# Patient Record
Sex: FEMALE | Race: WHITE | NOT HISPANIC OR LATINO | Employment: UNEMPLOYED | URBAN - METROPOLITAN AREA
[De-identification: names, ages, dates, MRNs, and addresses within clinical notes are randomized per-mention and may not be internally consistent; named-entity substitution may affect disease eponyms.]

---

## 2021-09-08 ENCOUNTER — TELEPHONE (OUTPATIENT)
Dept: RADIOLOGY | Facility: HOSPITAL | Age: 56
End: 2021-09-08

## 2021-09-13 ENCOUNTER — OFFICE VISIT (OUTPATIENT)
Dept: NEUROSURGERY | Facility: CLINIC | Age: 56
End: 2021-09-13
Payer: MEDICARE

## 2021-09-13 VITALS
HEIGHT: 71 IN | HEART RATE: 63 BPM | BODY MASS INDEX: 26.6 KG/M2 | DIASTOLIC BLOOD PRESSURE: 77 MMHG | WEIGHT: 190 LBS | SYSTOLIC BLOOD PRESSURE: 113 MMHG

## 2021-09-13 DIAGNOSIS — M54.14 THORACIC RADICULOPATHY: ICD-10-CM

## 2021-09-13 DIAGNOSIS — R07.9 CHEST PAIN, UNSPECIFIED TYPE: Primary | ICD-10-CM

## 2021-09-13 PROCEDURE — 99999 PR PBB SHADOW E&M-EST. PATIENT-LVL III: CPT | Mod: PBBFAC,,, | Performed by: NEUROLOGICAL SURGERY

## 2021-09-13 PROCEDURE — 99999 PR PBB SHADOW E&M-EST. PATIENT-LVL III: ICD-10-PCS | Mod: PBBFAC,,, | Performed by: NEUROLOGICAL SURGERY

## 2021-09-13 PROCEDURE — 99204 OFFICE O/P NEW MOD 45 MIN: CPT | Mod: S$PBB,,, | Performed by: NEUROLOGICAL SURGERY

## 2021-09-13 PROCEDURE — 99213 OFFICE O/P EST LOW 20 MIN: CPT | Mod: PBBFAC,PN | Performed by: NEUROLOGICAL SURGERY

## 2021-09-13 PROCEDURE — 99204 PR OFFICE/OUTPT VISIT, NEW, LEVL IV, 45-59 MIN: ICD-10-PCS | Mod: S$PBB,,, | Performed by: NEUROLOGICAL SURGERY

## 2021-09-13 RX ORDER — HYDROCODONE BITARTRATE AND ACETAMINOPHEN 7.5; 325 MG/1; MG/1
1 TABLET ORAL 2 TIMES DAILY PRN
COMMUNITY
Start: 2021-08-10

## 2021-09-13 RX ORDER — ESCITALOPRAM OXALATE 20 MG/1
20 TABLET ORAL DAILY
COMMUNITY
Start: 2021-04-15

## 2021-09-13 RX ORDER — ESTRADIOL 2 MG/1
2 TABLET ORAL DAILY
COMMUNITY
Start: 2021-07-19

## 2021-09-15 ENCOUNTER — TELEPHONE (OUTPATIENT)
Dept: NEUROSURGERY | Facility: CLINIC | Age: 56
End: 2021-09-15

## 2021-09-17 ENCOUNTER — TELEPHONE (OUTPATIENT)
Dept: CARDIOLOGY | Facility: CLINIC | Age: 56
End: 2021-09-17

## 2021-09-28 ENCOUNTER — TELEPHONE (OUTPATIENT)
Dept: CARDIOLOGY | Facility: CLINIC | Age: 56
End: 2021-09-28

## 2021-10-04 ENCOUNTER — TELEPHONE (OUTPATIENT)
Dept: NEUROSURGERY | Facility: CLINIC | Age: 56
End: 2021-10-04

## 2021-10-21 ENCOUNTER — OFFICE VISIT (OUTPATIENT)
Dept: CARDIOLOGY | Facility: CLINIC | Age: 56
End: 2021-10-21
Payer: MEDICARE

## 2021-10-21 VITALS
HEIGHT: 71 IN | WEIGHT: 194.25 LBS | HEART RATE: 56 BPM | DIASTOLIC BLOOD PRESSURE: 89 MMHG | BODY MASS INDEX: 27.19 KG/M2 | SYSTOLIC BLOOD PRESSURE: 141 MMHG

## 2021-10-21 DIAGNOSIS — R07.9 CHEST PAIN, UNSPECIFIED TYPE: ICD-10-CM

## 2021-10-21 DIAGNOSIS — Z01.810 PREOP CARDIOVASCULAR EXAM: ICD-10-CM

## 2021-10-21 PROCEDURE — 99204 PR OFFICE/OUTPT VISIT, NEW, LEVL IV, 45-59 MIN: ICD-10-PCS | Mod: S$PBB,25,, | Performed by: INTERNAL MEDICINE

## 2021-10-21 PROCEDURE — 99204 OFFICE O/P NEW MOD 45 MIN: CPT | Mod: S$PBB,25,, | Performed by: INTERNAL MEDICINE

## 2021-10-21 PROCEDURE — 99213 OFFICE O/P EST LOW 20 MIN: CPT | Mod: PBBFAC,PO | Performed by: INTERNAL MEDICINE

## 2021-10-21 PROCEDURE — 99999 PR PBB SHADOW E&M-EST. PATIENT-LVL III: ICD-10-PCS | Mod: PBBFAC,,, | Performed by: INTERNAL MEDICINE

## 2021-10-21 PROCEDURE — 99999 PR PBB SHADOW E&M-EST. PATIENT-LVL III: CPT | Mod: PBBFAC,,, | Performed by: INTERNAL MEDICINE

## 2021-10-21 PROCEDURE — 93010 ELECTROCARDIOGRAM REPORT: CPT | Mod: ,,, | Performed by: INTERNAL MEDICINE

## 2021-10-21 PROCEDURE — 93005 ELECTROCARDIOGRAM TRACING: CPT | Mod: PO

## 2021-10-21 PROCEDURE — 93010 EKG 12-LEAD: ICD-10-PCS | Mod: ,,, | Performed by: INTERNAL MEDICINE

## 2021-10-21 RX ORDER — TRAZODONE HYDROCHLORIDE 50 MG/1
TABLET ORAL
COMMUNITY
Start: 2021-09-23

## 2021-10-22 DIAGNOSIS — R07.9 CHEST PAIN, UNSPECIFIED TYPE: Primary | ICD-10-CM

## 2021-10-25 ENCOUNTER — HOSPITAL ENCOUNTER (OUTPATIENT)
Dept: RADIOLOGY | Facility: HOSPITAL | Age: 56
Discharge: HOME | End: 2021-10-25
Attending: INTERNAL MEDICINE
Payer: MEDICARE

## 2021-10-25 ENCOUNTER — CLINICAL SUPPORT (OUTPATIENT)
Dept: CARDIOLOGY | Facility: HOSPITAL | Age: 56
End: 2021-10-25
Attending: INTERNAL MEDICINE
Payer: MEDICARE

## 2021-10-25 VITALS — BODY MASS INDEX: 27.77 KG/M2 | HEIGHT: 70 IN | WEIGHT: 194 LBS

## 2021-10-25 DIAGNOSIS — R07.9 CHEST PAIN, UNSPECIFIED TYPE: ICD-10-CM

## 2021-10-25 PROCEDURE — 93017 CV STRESS TEST TRACING ONLY: CPT | Mod: PO

## 2021-10-25 PROCEDURE — 78452 STRESS TEST WITH MYOCARDIAL PERFUSION (CUPID ONLY): ICD-10-PCS | Mod: 26,,, | Performed by: INTERNAL MEDICINE

## 2021-10-25 PROCEDURE — 78452 HT MUSCLE IMAGE SPECT MULT: CPT | Mod: 26,,, | Performed by: INTERNAL MEDICINE

## 2021-10-25 PROCEDURE — 63600175 PHARM REV CODE 636 W HCPCS: Mod: PO | Performed by: INTERNAL MEDICINE

## 2021-10-25 PROCEDURE — 93016 STRESS TEST WITH MYOCARDIAL PERFUSION (CUPID ONLY): ICD-10-PCS | Mod: ,,, | Performed by: INTERNAL MEDICINE

## 2021-10-25 PROCEDURE — 93016 CV STRESS TEST SUPVJ ONLY: CPT | Mod: ,,, | Performed by: INTERNAL MEDICINE

## 2021-10-25 PROCEDURE — 93018 CV STRESS TEST I&R ONLY: CPT | Mod: ,,, | Performed by: INTERNAL MEDICINE

## 2021-10-25 PROCEDURE — 78452 HT MUSCLE IMAGE SPECT MULT: CPT | Mod: PO

## 2021-10-25 PROCEDURE — 93018 PR CARDIAC STRESS TST,INTERP/REPT ONLY: ICD-10-PCS | Mod: ,,, | Performed by: INTERNAL MEDICINE

## 2021-10-25 PROCEDURE — A9502 TC99M TETROFOSMIN: HCPCS | Mod: PO

## 2021-10-25 RX ORDER — REGADENOSON 0.08 MG/ML
0.4 INJECTION, SOLUTION INTRAVENOUS ONCE
Status: COMPLETED | OUTPATIENT
Start: 2021-10-25 | End: 2021-10-25

## 2021-10-25 RX ADMIN — REGADENOSON 0.4 MG: 0.08 INJECTION, SOLUTION INTRAVENOUS at 02:10

## 2021-10-26 LAB
CV PHARM DOSE: 0.4 MG
CV STRESS BASE HR: 76 BPM
DIASTOLIC BLOOD PRESSURE: 101 MMHG
NUC REST EJECTION FRACTION: 62
OHS CV CPX 1 MINUTE RECOVERY HEART RATE: 107 BPM
OHS CV CPX 85 PERCENT MAX PREDICTED HEART RATE MALE: 135
OHS CV CPX MAX PREDICTED HEART RATE: 159
OHS CV CPX PATIENT IS FEMALE: 1
OHS CV CPX PATIENT IS MALE: 0
OHS CV CPX PEAK DIASTOLIC BLOOD PRESSURE: 98 MMHG
OHS CV CPX PEAK HEAR RATE: 113 BPM
OHS CV CPX PEAK RATE PRESSURE PRODUCT: NORMAL
OHS CV CPX PEAK SYSTOLIC BLOOD PRESSURE: 142 MMHG
OHS CV CPX PERCENT MAX PREDICTED HEART RATE ACHIEVED: 71
OHS CV CPX RATE PRESSURE PRODUCT PRESENTING: NORMAL
OHS CV PHARM TIME: 1430 MIN
SYSTOLIC BLOOD PRESSURE: 140 MMHG

## 2021-10-27 ENCOUNTER — TELEPHONE (OUTPATIENT)
Dept: CARDIOLOGY | Facility: CLINIC | Age: 56
End: 2021-10-27
Payer: MEDICARE

## 2021-10-28 ENCOUNTER — PATIENT MESSAGE (OUTPATIENT)
Dept: NEUROSURGERY | Facility: CLINIC | Age: 56
End: 2021-10-28
Payer: MEDICARE

## 2021-10-28 DIAGNOSIS — M54.14 THORACIC RADICULOPATHY: Primary | ICD-10-CM

## 2021-10-29 ENCOUNTER — TELEPHONE (OUTPATIENT)
Dept: PAIN MEDICINE | Facility: CLINIC | Age: 56
End: 2021-10-29
Payer: MEDICARE

## 2021-11-01 DIAGNOSIS — M54.14 THORACIC RADICULOPATHY: Primary | ICD-10-CM

## 2021-11-02 ENCOUNTER — PATIENT MESSAGE (OUTPATIENT)
Dept: PAIN MEDICINE | Facility: CLINIC | Age: 56
End: 2021-11-02
Payer: MEDICARE

## 2021-11-04 ENCOUNTER — TELEPHONE (OUTPATIENT)
Dept: PAIN MEDICINE | Facility: CLINIC | Age: 56
End: 2021-11-04
Payer: MEDICARE

## 2021-11-04 DIAGNOSIS — M54.14 THORACIC RADICULOPATHY: Primary | ICD-10-CM

## 2021-11-04 RX ORDER — SODIUM CHLORIDE, SODIUM LACTATE, POTASSIUM CHLORIDE, CALCIUM CHLORIDE 600; 310; 30; 20 MG/100ML; MG/100ML; MG/100ML; MG/100ML
INJECTION, SOLUTION INTRAVENOUS CONTINUOUS
Status: CANCELLED | OUTPATIENT
Start: 2021-11-04

## 2021-11-08 ENCOUNTER — TELEPHONE (OUTPATIENT)
Dept: PAIN MEDICINE | Facility: CLINIC | Age: 56
End: 2021-11-08
Payer: MEDICARE

## 2021-11-11 ENCOUNTER — TELEPHONE (OUTPATIENT)
Dept: PAIN MEDICINE | Facility: CLINIC | Age: 56
End: 2021-11-11
Payer: MEDICARE

## 2021-11-11 DIAGNOSIS — M54.14 THORACIC RADICULOPATHY: Primary | ICD-10-CM

## 2021-11-12 ENCOUNTER — HOSPITAL ENCOUNTER (OUTPATIENT)
Dept: RADIOLOGY | Facility: HOSPITAL | Age: 56
Discharge: HOME | End: 2021-11-12
Attending: ANESTHESIOLOGY
Payer: MEDICARE

## 2021-11-12 ENCOUNTER — HOSPITAL ENCOUNTER (OUTPATIENT)
Facility: HOSPITAL | Age: 56
Discharge: HOME | End: 2021-11-12
Attending: ANESTHESIOLOGY | Admitting: ANESTHESIOLOGY
Payer: MEDICARE

## 2021-11-12 VITALS
HEIGHT: 70 IN | DIASTOLIC BLOOD PRESSURE: 82 MMHG | TEMPERATURE: 98 F | RESPIRATION RATE: 18 BRPM | BODY MASS INDEX: 27.77 KG/M2 | SYSTOLIC BLOOD PRESSURE: 135 MMHG | WEIGHT: 194 LBS | HEART RATE: 64 BPM | OXYGEN SATURATION: 98 %

## 2021-11-12 DIAGNOSIS — M54.14 THORACIC RADICULOPATHY: ICD-10-CM

## 2021-11-12 PROCEDURE — 62321 NJX INTERLAMINAR CRV/THRC: CPT | Mod: ,,, | Performed by: ANESTHESIOLOGY

## 2021-11-12 PROCEDURE — 62321 NJX INTERLAMINAR CRV/THRC: CPT | Mod: PO | Performed by: ANESTHESIOLOGY

## 2021-11-12 PROCEDURE — 76000 FLUOROSCOPY <1 HR PHYS/QHP: CPT | Mod: TC,PO

## 2021-11-12 PROCEDURE — 62321 PR INJ CERV/THORAC, W/GUIDANCE: ICD-10-PCS | Mod: ,,, | Performed by: ANESTHESIOLOGY

## 2021-11-12 PROCEDURE — 99152 MOD SED SAME PHYS/QHP 5/>YRS: CPT | Mod: ,,, | Performed by: ANESTHESIOLOGY

## 2021-11-12 PROCEDURE — 63600175 PHARM REV CODE 636 W HCPCS: Mod: PO | Performed by: ANESTHESIOLOGY

## 2021-11-12 PROCEDURE — 99152 PR MOD CONSCIOUS SEDATION, SAME PHYS, 5+ YRS, FIRST 15 MIN: ICD-10-PCS | Mod: ,,, | Performed by: ANESTHESIOLOGY

## 2021-11-12 RX ORDER — MIDAZOLAM HYDROCHLORIDE 2 MG/2ML
INJECTION, SOLUTION INTRAMUSCULAR; INTRAVENOUS
Status: DISCONTINUED | OUTPATIENT
Start: 2021-11-12 | End: 2021-11-12 | Stop reason: HOSPADM

## 2021-11-12 RX ORDER — FENTANYL CITRATE 50 UG/ML
INJECTION, SOLUTION INTRAMUSCULAR; INTRAVENOUS
Status: DISCONTINUED | OUTPATIENT
Start: 2021-11-12 | End: 2021-11-12 | Stop reason: HOSPADM

## 2021-11-12 RX ORDER — SODIUM CHLORIDE, SODIUM LACTATE, POTASSIUM CHLORIDE, CALCIUM CHLORIDE 600; 310; 30; 20 MG/100ML; MG/100ML; MG/100ML; MG/100ML
INJECTION, SOLUTION INTRAVENOUS CONTINUOUS
Status: DISCONTINUED | OUTPATIENT
Start: 2021-11-12 | End: 2021-11-12 | Stop reason: HOSPADM

## 2021-11-12 RX ADMIN — SODIUM CHLORIDE, SODIUM LACTATE, POTASSIUM CHLORIDE, AND CALCIUM CHLORIDE: .6; .31; .03; .02 INJECTION, SOLUTION INTRAVENOUS at 02:11

## 2021-11-29 ENCOUNTER — OFFICE VISIT (OUTPATIENT)
Dept: PAIN MEDICINE | Facility: CLINIC | Age: 56
End: 2021-11-29
Payer: MEDICARE

## 2021-11-29 VITALS
SYSTOLIC BLOOD PRESSURE: 131 MMHG | HEIGHT: 69 IN | RESPIRATION RATE: 18 BRPM | DIASTOLIC BLOOD PRESSURE: 66 MMHG | WEIGHT: 194.31 LBS | HEART RATE: 74 BPM | BODY MASS INDEX: 28.78 KG/M2 | TEMPERATURE: 98 F | OXYGEN SATURATION: 97 %

## 2021-11-29 DIAGNOSIS — M54.14 THORACIC RADICULOPATHY: ICD-10-CM

## 2021-11-29 DIAGNOSIS — M47.814 THORACIC SPONDYLOSIS: Primary | ICD-10-CM

## 2021-11-29 PROCEDURE — 99214 OFFICE O/P EST MOD 30 MIN: CPT | Mod: PBBFAC,PN | Performed by: ANESTHESIOLOGY

## 2021-11-29 PROCEDURE — 99999 PR PBB SHADOW E&M-EST. PATIENT-LVL IV: CPT | Mod: PBBFAC,,, | Performed by: ANESTHESIOLOGY

## 2021-11-29 PROCEDURE — 99204 OFFICE O/P NEW MOD 45 MIN: CPT | Mod: S$PBB,,, | Performed by: ANESTHESIOLOGY

## 2021-11-29 PROCEDURE — 99999 PR PBB SHADOW E&M-EST. PATIENT-LVL IV: ICD-10-PCS | Mod: PBBFAC,,, | Performed by: ANESTHESIOLOGY

## 2021-11-29 PROCEDURE — 99204 PR OFFICE/OUTPT VISIT, NEW, LEVL IV, 45-59 MIN: ICD-10-PCS | Mod: S$PBB,,, | Performed by: ANESTHESIOLOGY

## 2021-11-29 RX ORDER — METHOCARBAMOL 750 MG/1
750 TABLET, FILM COATED ORAL 2 TIMES DAILY PRN
Qty: 40 TABLET | Refills: 2 | Status: SHIPPED | OUTPATIENT
Start: 2021-11-29

## 2022-02-16 ENCOUNTER — OFFICE VISIT (OUTPATIENT)
Dept: PAIN MEDICINE | Facility: CLINIC | Age: 57
End: 2022-02-16
Payer: MEDICARE

## 2022-02-16 VITALS
OXYGEN SATURATION: 98 % | BODY MASS INDEX: 29.19 KG/M2 | SYSTOLIC BLOOD PRESSURE: 122 MMHG | HEIGHT: 69 IN | DIASTOLIC BLOOD PRESSURE: 78 MMHG | HEART RATE: 75 BPM | WEIGHT: 197.06 LBS | TEMPERATURE: 97 F

## 2022-02-16 DIAGNOSIS — M54.6 MIDLINE THORACIC BACK PAIN, UNSPECIFIED CHRONICITY: ICD-10-CM

## 2022-02-16 DIAGNOSIS — M47.814 THORACIC SPONDYLOSIS: Primary | ICD-10-CM

## 2022-02-16 PROCEDURE — 99999 PR PBB SHADOW E&M-EST. PATIENT-LVL III: CPT | Mod: PBBFAC,,, | Performed by: ANESTHESIOLOGY

## 2022-02-16 PROCEDURE — 99213 OFFICE O/P EST LOW 20 MIN: CPT | Mod: S$PBB,,, | Performed by: ANESTHESIOLOGY

## 2022-02-16 PROCEDURE — 99999 PR PBB SHADOW E&M-EST. PATIENT-LVL III: ICD-10-PCS | Mod: PBBFAC,,, | Performed by: ANESTHESIOLOGY

## 2022-02-16 PROCEDURE — 99213 PR OFFICE/OUTPT VISIT, EST, LEVL III, 20-29 MIN: ICD-10-PCS | Mod: S$PBB,,, | Performed by: ANESTHESIOLOGY

## 2022-02-16 PROCEDURE — 99213 OFFICE O/P EST LOW 20 MIN: CPT | Mod: PBBFAC,PN | Performed by: ANESTHESIOLOGY

## 2022-02-16 NOTE — PROGRESS NOTES
This note was completed with dictation software and grammatical errors may exist.    CC:  Upper back pain    HPI:  The patient is a 56-year-old woman with a history of thoracic DDD who presents in referral from Dr. Ramon for thoracic radicular pain.  Since I had last seen the patient, she reports that while we had set her up for physical therapy, they stated they never had the orders and so she was never able to attend.  However she has been working with her  on stretching and massage on a daily basis for several weeks and reports that she has actually had about 90% improvement.  She still does feel some discomfort with standing too long or twisting certain ways but otherwise the pain is much better.  She states that she is back to doing most normal activity.  She has not started any strengthening activities or exercise.    Previous History:  Patient reports having pain beginning about 1 year ago, has had some neck pain in the past and upper back pain at times.  However in about October or November of 2020, she was lifting heavy tub and developed severe pain starting at her occiput that radiated through her neck and into her upper back and midback.  She states that for a couple months she had numbness and tingling in her arms and her arms were heavy.  She had seen physicians in her area who had tried what sounds like an epidural steroid injection without much relief.  She does report that the pain has now settled in her mid back, points to the bra line, radiates around the anterior chest underneath the breast but not into the breast her above that region.  She denies any pain located in her neck or arms but does continue to have numbness and tingling in her arms and heaviness every several months.  She denies any weakness in her arms, denies any balance issues.  She continues to have severe pain in her back, worse with any twisting and turning, can cause sudden sharp pain and does radiate around the  chest.  She describes the pain as sharp, shooting worse with bending, coughing or sneezing, worse at night, worse with doing any extension, flexing or lifting.  She does get some relief with rest and sitting down.  Rarely she takes hydrocodone. She was sent for a T7/8 STIVEN. She is status post T7/8 STIVEN on 11/12/2021 with no relief.     Pain intervention history:  She does report that she had what she thinks is an epidural steroid injection in the thoracic region several months ago with no relief.She is status post T7/8 STIVEN on 11/12/2021 with no relief.       Spine surgeries: She has a history of lumbar discectomy about 20 years ago    Antineuropathics:  Had side effect of swelling with gabapentin  NSAIDs:  Had swelling with Celebrex  Physical therapy:  She states that they did not send her for physical therapy because they did not know if it was her neck or thoracic spine that was causing her pain  Antidepressants:  Lexapro 20, trazodone 50  Muscle relaxers:  She takes tizanidine about once a week, causes drowsiness even the next morning  Opioids:  Rarely takes hydrocodone 5/325  Antiplatelets/Anticoagulants:        ROS:  She reports weight gain, joint stiffness, back pain and difficulty sleeping.  Balance of review of systems is negative.    No results found for: LABA1C, HGBA1C    No results found for: WBC, HGB, HCT, MCV, PLT      No past medical history on file.    Past Surgical History:   Procedure Laterality Date    CHOLECYSTECTOMY      EPIDURAL STEROID INJECTION INTO THORACIC SPINE N/A 11/12/2021    Procedure: Injection-steroid-epidural-thoracic T7/8;  Surgeon: Bryan Lao MD;  Location: [facility];  Service: Pain Management;  Laterality: N/A;    FEMUR FRACTURE SURGERY  1991    HYSTERECTOMY      KNEE SURGERY      lower back surgery         Social History     Socioeconomic History    Marital status:    Tobacco Use    Smoking status: Never Smoker    Smokeless tobacco: Never Used   Substance and  "Sexual Activity    Alcohol use: Never    Drug use: Never         Medications/Allergies: See med card    Vitals:    02/16/22 0913   BP: 122/78   Pulse: 75   Temp: 97.2 °F (36.2 °C)   SpO2: 98%   Weight: 89.4 kg (197 lb 1.5 oz)   Height: 5' 9" (1.753 m)   PainSc:   2   PainLoc: Back         Physical exam:  Gen: A and O x3, pleasant, well-groomed  Skin: No rashes or obvious lesions  HEENT: PERRLA, no obvious deformities on ears or in canals.Trachea midline.  CVS: Regular rate and rhythm, normal palpable pulses.  Resp: Clear to auscultation bilaterally, no wheezes or rales.  Abdomen: Soft, NT/ND.  Musculoskeletal: Able to heel walk, toe walk. No antalgic gait.     Neuro:  Upper extremities: 5/5 strength bilaterally   Reflexes: Brachioradialis 2+, Bicep 2+, Tricep 2+.   Sensory: Intact and symmetrical to light touch and pinprick in C2-T1 dermatomes bilaterally.    Cervical Spine:  Cervical spine: ROM is mildly reduced in flexion, extension and lateral rotation without increased pain.  Spurling's maneuver causes no neck pain to either side.  Myofascial exam: No Tenderness to palpation across cervical paraspinous region bilaterally.  No increased pain with forward flexion of the lumbar and thoracic spine but significant pain moving from fully flexed to neutral position.  No noticeable increased pain with oblique extension or extension directly backwards.        Imaging:  MRI cervical spine 04/09/2021:  Outside institution.  There appears to be slight listhesis of C3 on C4 with disc protrusion thinning the anterior thecal sac and approaching the cord without any compression of the cord, no foraminal narrowing.  At C4/5 there is disc degeneration, thinning of the anterior thecal sac and left greater than right foraminal narrowing.  At C5/6 there appears to be a retrolisthesis of C5 on C6 with broad-based disc bulging more to the right with slight deformation of the cord on the right side.  There appears to be significant " right foraminal narrowing, no foraminal narrowing to the left.  At C6/7 there is disc degeneration with thinning of the anterior thecal sac but no cord contact, left foraminal narrowing is present.    Thoracic spine MRI 04/09/2021.  There is slight anterolisthesis of T 2 on T3 but no major foraminal narrowing or canal narrowing.  There is disc bulge at T7/8 but no contact of the cord, does appear to slightly change the shape of the left anterior cord.  No foraminal narrowing.  There is a slight disc bulge more to the right at T11/12 but no foraminal or canal narrowing.    Assessment:   The patient is a 56-year-old woman with a history of thoracic DDD who presents in referral from Dr. Ramon for thoracic radicular pain.     1. Thoracic spondylosis     2. Midline thoracic back pain, unspecified chronicity         Plan:  1. We discussed that she had good relief with the stretching, now I think it will be important for her to work on overall endurance, strengthening and I recommended that she work on walking for exercise, core strength but also using resistance bands to work on upper back strength.  I will have her follow up as needed for now.

## 2023-03-31 ENCOUNTER — OFFICE VISIT (OUTPATIENT)
Dept: NEUROSURGERY | Facility: CLINIC | Age: 58
End: 2023-03-31
Payer: MEDICARE

## 2023-03-31 VITALS
SYSTOLIC BLOOD PRESSURE: 128 MMHG | BODY MASS INDEX: 29.18 KG/M2 | HEIGHT: 69 IN | RESPIRATION RATE: 18 BRPM | DIASTOLIC BLOOD PRESSURE: 88 MMHG | WEIGHT: 197 LBS | HEART RATE: 67 BPM

## 2023-03-31 DIAGNOSIS — M48.02 CERVICAL STENOSIS OF SPINE: ICD-10-CM

## 2023-03-31 DIAGNOSIS — M54.14 THORACIC RADICULOPATHY: Primary | ICD-10-CM

## 2023-03-31 PROCEDURE — 99214 PR OFFICE/OUTPT VISIT, EST, LEVL IV, 30-39 MIN: ICD-10-PCS | Mod: S$PBB,,, | Performed by: PHYSICIAN ASSISTANT

## 2023-03-31 PROCEDURE — 99214 OFFICE O/P EST MOD 30 MIN: CPT | Mod: S$PBB,,, | Performed by: PHYSICIAN ASSISTANT

## 2023-03-31 RX ORDER — LINACLOTIDE 145 UG/1
145 CAPSULE, GELATIN COATED ORAL
COMMUNITY
Start: 2023-03-29

## 2023-03-31 RX ORDER — DEXTROAMPHETAMINE SACCHARATE, AMPHETAMINE ASPARTATE, DEXTROAMPHETAMINE SULFATE AND AMPHETAMINE SULFATE 5; 5; 5; 5 MG/1; MG/1; MG/1; MG/1
TABLET ORAL
COMMUNITY
Start: 2023-03-05

## 2023-03-31 RX ORDER — FOLIC ACID 1 MG/1
1000 TABLET ORAL
COMMUNITY
Start: 2022-12-21

## 2023-03-31 NOTE — PROGRESS NOTES
Neurosurgery History & Physical    Patient ID: Lo Feliciano is a 57 y.o. female.    Chief Complaint   Patient presents with    Spine Pain     Patient present to clinic today as back/neck pain. Patient states of neck/mid back pain. Numbness/tingling radiating into bilateral arms; weakness. Worsening symptoms.        Review of Systems   Constitutional:  Negative for chills, diaphoresis, fatigue and fever.   HENT:  Negative for congestion, ear pain, rhinorrhea, sneezing, sore throat and tinnitus.    Eyes:  Negative for photophobia, pain, redness and visual disturbance.   Respiratory:  Negative for cough, chest tightness, shortness of breath and wheezing.    Cardiovascular:  Negative for chest pain, palpitations and leg swelling.   Gastrointestinal:  Negative for abdominal distention, abdominal pain, constipation, diarrhea, nausea and vomiting.   Genitourinary:  Negative for difficulty urinating, dysuria, frequency and urgency.   Musculoskeletal:  Positive for back pain and neck pain. Negative for gait problem and myalgias.   Skin:  Negative for pallor and rash.   Neurological:  Positive for numbness and headaches. Negative for dizziness, seizures, speech difficulty and weakness.   Psychiatric/Behavioral:  Negative for confusion and hallucinations.      History reviewed. No pertinent past medical history.  Social History     Socioeconomic History    Marital status:    Tobacco Use    Smoking status: Never    Smokeless tobacco: Never   Substance and Sexual Activity    Alcohol use: Never    Drug use: Never     History reviewed. No pertinent family history.  Review of patient's allergies indicates:   Allergen Reactions    Celecoxib Swelling    Gabapentin Swelling    Hydroxychloroquine Nausea Only       Current Outpatient Medications:     dextroamphetamine-amphetamine (ADDERALL) 20 mg tablet, Adderall 20 mg tablet  Take 1 tablet every day by oral route., Disp: , Rfl:     estradioL (ESTRACE) 2 MG tablet, Take 2 mg by  "mouth once daily., Disp: , Rfl:     HYDROcodone-acetaminophen (NORCO) 7.5-325 mg per tablet, Take 1 tablet by mouth 2 (two) times daily as needed., Disp: , Rfl:     EScitalopram oxalate (LEXAPRO) 20 MG tablet, Take 20 mg by mouth once daily., Disp: , Rfl:     folic acid (FOLVITE) 1 MG tablet, Take 1,000 mcg by mouth., Disp: , Rfl:     LINZESS 145 mcg Cap capsule, Take 145 mcg by mouth., Disp: , Rfl:     methocarbamoL (ROBAXIN) 750 MG Tab, Take 1 tablet (750 mg total) by mouth 2 (two) times daily as needed (back pain). (Patient not taking: Reported on 3/31/2023), Disp: 40 tablet, Rfl: 2    methotrexate (XATMEP ORAL), methotrexate  three pills once a week, Disp: , Rfl:     traZODone (DESYREL) 50 MG tablet, TAKE 1 TABLET BY MOUTH ONCE DAILY AT BEDTIME AS NEEDED FOR SLEEP, Disp: , Rfl:   Blood pressure 128/88, pulse 67, resp. rate 18, height 5' 9" (1.753 m), weight 89.4 kg (197 lb).      Neurologic Exam     Mental Status   Oriented to person, place, and time.   Oriented to person.   Oriented to place.   Oriented to time.   Follows 3 step commands.   Attention: normal. Concentration: normal.   Speech: speech is normal   Level of consciousness: alert  Knowledge: consistent with education.   Able to name object. Able to read. Able to repeat. Able to write. Normal comprehension.      Cranial Nerves      CN II   Visual acuity: normal  Right visual field deficit: none  Left visual field deficit: none      CN III, IV, VI   Pupils are equal, round, and reactive to light.  Right pupil: Size: 3 mm. Shape: regular. Reactivity: brisk. Consensual response: intact.   Left pupil: Size: 3 mm. Shape: regular. Reactivity: brisk. Consensual response: intact.   CN III: no CN III palsy  CN VI: no CN VI palsy  Nystagmus: none   Diplopia: none  Ophthalmoparesis: none  Conjugate gaze: present     CN V   Right facial sensation deficit: none  Left facial sensation deficit: none     CN VII   Right facial weakness: none  Left facial weakness: " none     CN VIII   Hearing: intact     CN IX, X   CN IX normal.   CN X normal.      CN XI   Right sternocleidomastoid strength: normal  Left sternocleidomastoid strength: normal  Right trapezius strength: normal  Left trapezius strength: normal     CN XII   Fasciculations: absent  Tongue deviation: none     Motor Exam   Muscle bulk: normal  Overall muscle tone: normal  Right arm pronator drift: absent  Left arm pronator drift: absent     Strength   Right deltoid: 5/5  Left deltoid: 5/5  Right biceps: 4+/5  Left biceps: 5/5  Right triceps: 5/5  Left triceps: 5/5  Right wrist flexion: 5/5  Left wrist flexion: 5/5  Right wrist extension: 5/5  Left wrist extension: 5/5  Right interossei: 4/5  Left interossei: 5/5  Right iliopsoas: 4/5-pain limited (hip)  Left iliopsoas: 5/5  Right quadriceps: 5/5  Left quadriceps: 5/5  Right hamstrin/5  Left hamstrin/5  Right anterior tibial: 5/5  Left anterior tibial: 5/5  Right posterior tibial: 5/5  Left posterior tibial: 5/5  Right peroneal: 5/5  Left peroneal: 5/5  Right gastroc: 5/5  Left gastroc: 5/5     Sensory Exam   Right arm light touch: normal  Left arm light touch: normal  Right leg light touch: normal  Left leg light touch: normal     Gait, Coordination, and Reflexes      Gait  Gait: normal      Coordination   Romberg: negative  Finger to nose coordination: normal  Heel to shin coordination: normal  Tandem walking coordination: normal     Tremor   Resting tremor: absent  Intention tremor: absent  Action tremor: absent     Reflexes   Right brachioradialis: 1+  Left brachioradialis: 1+  Right biceps: 1+  Left biceps: 1+  Right triceps: 2+  Left triceps: 2+  Right patellar: 2+  Left patellar: 2+  Right achilles: 0  Left achilles: 0  Right Cox: absent  Left Cox: present (mild)  Right ankle clonus: absent  Left ankle clonus: absent  Right plantar: normal  Left plantar: normal    Physical Exam  Constitutional: Oriented to person, place, and time. Appears  well-developed and well-nourished.   HENT:   Head: Normocephalic and atraumatic.   Eyes: Pupils are equal, round, and reactive to light.   Neck: Normal range of motion. Neck supple.   Cardiovascular: Normal rate.    Pulmonary/Chest: Effort normal.   Musculoskeletal: Normal range of motion. Exhibits no edema.   Neurological: Alert and oriented to person, place, and time. Normal Finger-Nose-Finger Test, a normal Heel to Shin Test, a normal Romberg Test and a normal Tandem Gait Test. Gait normal.   Skin: Skin is warm, dry and intact.   Psychiatric: Normal mood and affect. Speech is normal and behavior is normal. Judgment and thought content normal.   Nursing note and vitals reviewed.    Provider dictation:    The patient is a 57 year old female who presents for neurosurgical evaluation. She was last seen in 2021 for upper back pain and neck pain. Imaging revealed a small disc herniation at T7-T8 and mult-level cervical spondylotic disease with no spinal cord compression. The patient was referred to cardiology at that time to rule out cardiac etiology of the radiating chest pain and then referred to pain management for a thoracic STIVEN. She reports overall improvement in her symptoms following the STIVEN. She presents to clinic today reporting 2 month history of recurring upper back pain and neck pain. The pain is again radiating into bilateral chest. She also reports 2 month history of worsening gait instability as well as numbness/pain in bilateral upper extremities/hands. Currently she denies upper extremity symptoms and states that the most recent episode only lasted for about 1 day.     On exam there is mild right bicep and hand intrinsic weakness. She has a mild left shell sign. There is some pain limited right hip flexion weakness.     I will obtain an updated MRI of the cervical and thoracic spine. I will call the patient with the results and further plan.      1. Thoracic radiculopathy  MRI Thoracic Spine Without  Contrast      2. Cervical stenosis of spine  MRI Cervical Spine Without Contrast

## 2023-04-12 ENCOUNTER — HOSPITAL ENCOUNTER (OUTPATIENT)
Dept: RADIOLOGY | Facility: HOSPITAL | Age: 58
Discharge: HOME | End: 2023-04-12
Attending: PHYSICIAN ASSISTANT
Payer: MEDICARE

## 2023-04-12 DIAGNOSIS — M54.14 THORACIC RADICULOPATHY: ICD-10-CM

## 2023-04-12 DIAGNOSIS — M48.02 CERVICAL STENOSIS OF SPINE: ICD-10-CM

## 2023-04-12 PROCEDURE — 72146 MRI CHEST SPINE W/O DYE: CPT | Mod: 26,,, | Performed by: RADIOLOGY

## 2023-04-12 PROCEDURE — 72141 MRI CERVICAL SPINE WITHOUT CONTRAST: ICD-10-PCS | Mod: 26,,, | Performed by: RADIOLOGY

## 2023-04-12 PROCEDURE — 72146 MRI THORACIC SPINE WITHOUT CONTRAST: ICD-10-PCS | Mod: 26,,, | Performed by: RADIOLOGY

## 2023-04-12 PROCEDURE — 72141 MRI NECK SPINE W/O DYE: CPT | Mod: 26,,, | Performed by: RADIOLOGY

## 2023-04-12 PROCEDURE — 72141 MRI NECK SPINE W/O DYE: CPT | Mod: TC,PO

## 2023-04-12 PROCEDURE — 72146 MRI CHEST SPINE W/O DYE: CPT | Mod: TC,PO

## 2024-05-29 DIAGNOSIS — M25.562 LEFT KNEE PAIN, UNSPECIFIED CHRONICITY: Primary | ICD-10-CM

## 2024-05-31 ENCOUNTER — TELEPHONE (OUTPATIENT)
Dept: ORTHOPEDICS | Facility: CLINIC | Age: 59
End: 2024-05-31
Payer: MEDICARE

## 2024-05-31 ENCOUNTER — HOSPITAL ENCOUNTER (OUTPATIENT)
Dept: RADIOLOGY | Facility: HOSPITAL | Age: 59
Discharge: HOME | End: 2024-05-31
Attending: ORTHOPAEDIC SURGERY
Payer: MEDICARE

## 2024-05-31 ENCOUNTER — OFFICE VISIT (OUTPATIENT)
Dept: ORTHOPEDICS | Facility: CLINIC | Age: 59
End: 2024-05-31
Payer: MEDICARE

## 2024-05-31 VITALS — OXYGEN SATURATION: 97 % | WEIGHT: 197.06 LBS | BODY MASS INDEX: 29.19 KG/M2 | HEART RATE: 66 BPM | HEIGHT: 69 IN

## 2024-05-31 DIAGNOSIS — M17.12 PRIMARY OSTEOARTHRITIS OF LEFT KNEE: Primary | ICD-10-CM

## 2024-05-31 DIAGNOSIS — M25.562 LEFT KNEE PAIN, UNSPECIFIED CHRONICITY: ICD-10-CM

## 2024-05-31 PROCEDURE — 73562 X-RAY EXAM OF KNEE 3: CPT | Mod: TC,PO,RT

## 2024-05-31 PROCEDURE — 99999 PR PBB SHADOW E&M-EST. PATIENT-LVL III: CPT | Mod: PBBFAC,,, | Performed by: ORTHOPAEDIC SURGERY

## 2024-05-31 PROCEDURE — 73562 X-RAY EXAM OF KNEE 3: CPT | Mod: 26,59,RT, | Performed by: RADIOLOGY

## 2024-05-31 PROCEDURE — 20610 DRAIN/INJ JOINT/BURSA W/O US: CPT | Mod: PBBFAC,PO,LT | Performed by: ORTHOPAEDIC SURGERY

## 2024-05-31 PROCEDURE — 99205 OFFICE O/P NEW HI 60 MIN: CPT | Mod: S$PBB,25,, | Performed by: ORTHOPAEDIC SURGERY

## 2024-05-31 PROCEDURE — 99213 OFFICE O/P EST LOW 20 MIN: CPT | Mod: PBBFAC,25,PO | Performed by: ORTHOPAEDIC SURGERY

## 2024-05-31 PROCEDURE — 73564 X-RAY EXAM KNEE 4 OR MORE: CPT | Mod: 26,LT,, | Performed by: RADIOLOGY

## 2024-05-31 PROCEDURE — 99999PBSHW PR PBB SHADOW TECHNICAL ONLY FILED TO HB: Mod: PBBFAC,,,

## 2024-05-31 PROCEDURE — 73564 X-RAY EXAM KNEE 4 OR MORE: CPT | Mod: TC,PO,LT

## 2024-05-31 RX ORDER — TRIAMCINOLONE ACETONIDE 40 MG/ML
40 INJECTION, SUSPENSION INTRA-ARTICULAR; INTRAMUSCULAR
Status: DISCONTINUED | OUTPATIENT
Start: 2024-05-31 | End: 2024-05-31 | Stop reason: HOSPADM

## 2024-05-31 RX ORDER — MELOXICAM 7.5 MG/1
7.5 TABLET ORAL DAILY
COMMUNITY
Start: 2024-03-26 | End: 2024-07-24

## 2024-05-31 RX ORDER — DEXMETHYLPHENIDATE HYDROCHLORIDE 20 MG/1
20 CAPSULE, EXTENDED RELEASE ORAL EVERY MORNING
COMMUNITY
Start: 2024-02-21

## 2024-05-31 RX ORDER — METAXALONE 800 MG/1
TABLET ORAL
COMMUNITY
Start: 2024-02-21

## 2024-05-31 RX ORDER — BETAMETHASONE SODIUM PHOSPHATE AND BETAMETHASONE ACETATE 3; 3 MG/ML; MG/ML
INJECTION, SUSPENSION INTRA-ARTICULAR; INTRALESIONAL; INTRAMUSCULAR; SOFT TISSUE
COMMUNITY
Start: 2023-12-03

## 2024-05-31 RX ADMIN — TRIAMCINOLONE ACETONIDE 40 MG: 40 INJECTION, SUSPENSION INTRA-ARTICULAR; INTRAMUSCULAR at 01:05

## 2024-05-31 NOTE — PROGRESS NOTES
Subjective:      Patient ID: Lo Feliciano is a 58 y.o. female.    Chief Complaint: Pain and Swelling of the Left Knee    HPI   58-year-old female long history of intermittent problems with the left knee.  Underwent left knee arthroscopy and meniscectomy she believes back in 2015.  She has had injections in the past most recently back in March.  This gave her some short-term relief for several months.  Pain has recurred.  She was doing an old house and has quite a few projects ongoing her pain in his limiting her at this point.  She is complaining of pain with prolonged standing and walking.  Meloxicam does not seem to be helping.  She was given 7.5 mg  ROS      Objective:    Ortho Exam      Constitutional:   Patient is alert  and oriented in no acute distress  HEENT:  normocephalic atraumatic; PERRL EOMI  Neck:  Supple without adenopathy  Cardiovascular:  Normal rate and rhythm  Pulmonary:  Normal respiratory effort normal chest wall expansion  Abdominal:  Nonprotuberant nondistended  Musculoskeletal: patient has a mildly antalgic gait small effusion noted   She has diffuse primarily medial joint line tenderness and difficulty with full active knee extension   She has crepitus with range of motion without any gross instability no increased warmth or erythema  Neurological:  No focal defect; cranial nerves 2-12 grossly intact  Psychiatric/behavioral:  Mood and behavior normal           My Radiographs Findings:     Radiographs of the left knee consistent with severe degenerative change joint  space narrowing subchondral sclerosis osteophyte formation.  Assessment:       Encounter Diagnosis   Name Primary?    Primary osteoarthritis of left knee Yes         Plan:         I have discussed medical condition and treatment options with her at length.  After a verbal consent and sterile prep at her request I injected her left knee today without complication.  I have discussed upping her meloxicam to 15 mg daily if she has  had no stomach upset and approved by her PCP  .  Long-term with the degenerative nature of her knee I think symptoms are likely only to be  predictably improve by total knee replacement.  Some consideration could be given to a repeat arthroscopic surgery but in my opinion that would give him only short-term incomplete relief of her pain.  We have discussed general knee rehab follow up can be as needed.        History reviewed. No pertinent past medical history.  Past Surgical History:   Procedure Laterality Date    CHOLECYSTECTOMY      EPIDURAL STEROID INJECTION INTO THORACIC SPINE N/A 11/12/2021    Procedure: Injection-steroid-epidural-thoracic T7/8;  Surgeon: Bryan Lao MD;  [facility];  Service: Pain Management;  Laterality: N/A;    FEMUR FRACTURE SURGERY  1991    HYSTERECTOMY      KNEE SURGERY      lower back surgery           Current Outpatient Medications:     betamethasone acetate-betamethasone sodium phosphate (CELESTONE SOLUSPAN) 6 mg/mL injection, Take 1.5 mL by injection route., Disp: , Rfl:     dexmethylphenidate (FOCALIN XR) 20 MG 24 hr capsule, Take 20 mg by mouth every morning., Disp: , Rfl:     dextroamphetamine-amphetamine (ADDERALL) 20 mg tablet, Adderall 20 mg tablet  Take 1 tablet every day by oral route., Disp: , Rfl:     EScitalopram oxalate (LEXAPRO) 20 MG tablet, Take 20 mg by mouth once daily., Disp: , Rfl:     estradioL (ESTRACE) 2 MG tablet, Take 2 mg by mouth once daily., Disp: , Rfl:     folic acid (FOLVITE) 1 MG tablet, Take 1,000 mcg by mouth., Disp: , Rfl:     HYDROcodone-acetaminophen (NORCO) 7.5-325 mg per tablet, Take 1 tablet by mouth 2 (two) times daily as needed., Disp: , Rfl:     LINZESS 145 mcg Cap capsule, Take 145 mcg by mouth., Disp: , Rfl:     meloxicam (MOBIC) 7.5 MG tablet, Take 7.5 mg by mouth once daily., Disp: , Rfl:     metaxalone (SKELAXIN) 800 MG tablet, , Disp: , Rfl:     methotrexate (XATMEP ORAL), methotrexate  three pills once a week, Disp: ,  Rfl:     traZODone (DESYREL) 50 MG tablet, TAKE 1 TABLET BY MOUTH ONCE DAILY AT BEDTIME AS NEEDED FOR SLEEP, Disp: , Rfl:     methocarbamoL (ROBAXIN) 750 MG Tab, Take 1 tablet (750 mg total) by mouth 2 (two) times daily as needed (back pain). (Patient not taking: Reported on 5/31/2024), Disp: 40 tablet, Rfl: 2    Review of patient's allergies indicates:   Allergen Reactions    Celecoxib Swelling    Gabapentin Swelling    Hydroxychloroquine Nausea Only       No family history on file.  Social History     Occupational History    Not on file   Tobacco Use    Smoking status: Never    Smokeless tobacco: Never   Substance and Sexual Activity    Alcohol use: Never    Drug use: Never    Sexual activity: Not on file

## 2024-05-31 NOTE — PROCEDURES
Large Joint Aspiration/Injection: L knee    Date/Time: 5/31/2024 1:00 PM    Performed by: Duran Mendez MD  Authorized by: Duran Mendez MD    Consent Done?:  Yes (Verbal)  Indications:  Pain  Site marked: the procedure site was marked    Timeout: prior to procedure the correct patient, procedure, and site was verified    Local anesthetic:  Lidocaine 1% without epinephrine and bupivacaine 0.25% without epinephrine  Anesthetic total (ml):  6      Details:  Needle Size:  20 G  Approach:  Anterolateral  Location:  Knee  Site:  L knee  Medications:  40 mg triamcinolone acetonide 40 mg/mL  Patient tolerance:  Patient tolerated the procedure well with no immediate complications

## 2024-11-20 ENCOUNTER — TELEPHONE (OUTPATIENT)
Dept: NEUROSURGERY | Facility: CLINIC | Age: 59
End: 2024-11-20
Payer: MEDICARE

## 2024-11-21 ENCOUNTER — TELEPHONE (OUTPATIENT)
Dept: NEUROSURGERY | Facility: CLINIC | Age: 59
End: 2024-11-21
Payer: MEDICARE

## 2024-11-21 NOTE — TELEPHONE ENCOUNTER
----- Message from Jamshid sent at 11/21/2024  1:59 PM CST -----  Contact: self  Type: Needs Medical Advice  Who Called:  PT    Best Call Back Number: [phone]   Additional Information: Please call to reschedule tomorrows appt.

## 2024-11-25 ENCOUNTER — OFFICE VISIT (OUTPATIENT)
Dept: NEUROSURGERY | Facility: CLINIC | Age: 59
End: 2024-11-25
Payer: MEDICARE

## 2024-11-25 VITALS
WEIGHT: 197.06 LBS | RESPIRATION RATE: 18 BRPM | BODY MASS INDEX: 29.19 KG/M2 | DIASTOLIC BLOOD PRESSURE: 89 MMHG | HEIGHT: 69 IN | SYSTOLIC BLOOD PRESSURE: 137 MMHG | HEART RATE: 61 BPM

## 2024-11-25 DIAGNOSIS — M54.14 THORACIC RADICULOPATHY: ICD-10-CM

## 2024-11-25 DIAGNOSIS — M48.02 CERVICAL STENOSIS OF SPINE: Primary | ICD-10-CM

## 2024-11-25 PROCEDURE — 99214 OFFICE O/P EST MOD 30 MIN: CPT | Mod: S$PBB,,, | Performed by: PHYSICIAN ASSISTANT

## 2024-11-25 RX ORDER — METHYLPREDNISOLONE 4 MG/1
TABLET ORAL
Qty: 21 EACH | Refills: 0 | Status: SHIPPED | OUTPATIENT
Start: 2024-11-25 | End: 2024-12-16

## 2024-11-25 RX ORDER — ACETAMINOPHEN AND CODEINE PHOSPHATE 300; 30 MG/1; MG/1
1 TABLET ORAL
COMMUNITY

## 2024-11-25 NOTE — PROGRESS NOTES
Neurosurgery History and Physical    Patient ID: Lo Feliciano is a 59 y.o. female.    Chief Complaint   Patient presents with    Neck Pain    Back Pain       HPI 11/25/24:  Patient is a 59 year old female who presents today for neck pain. She has had this pain for at least 3 years or so but noticed worsening in the last few months. She describes pain that radiates into the bilateral shoulders and has numbness and tingling into the bilateral pinky fingers that started 2 days ago. Her neck pain usually starts as a headache that then travels. She also is having worsening of a midback pain that wraps around under her bra line to her center chest, described as stabbing. She has had cardiac work up without any issues. She feels that she is dropping cups and pens, her handwriting has gotten worse as well. She states her balance is off, but is not worsening since last year. She denies weakness.  For pain, she has tried ice, heat, toradol, T#3, and has norco but doesn't like to take it. She sees pain management, but has not had any injections since 2021. This injection was helpful at the time.     Review of Systems   Musculoskeletal:  Positive for neck pain.   Neurological:  Positive for weakness and numbness.       History reviewed. No pertinent past medical history.  Social History     Socioeconomic History    Marital status:    Tobacco Use    Smoking status: Never    Smokeless tobacco: Never   Substance and Sexual Activity    Alcohol use: Never    Drug use: Never     Social Drivers of Health     Financial Resource Strain: Low Risk  (8/14/2024)    Received from [facility] and [facility]    Overall Financial Resource Strain (CARDIA)     Difficulty of Paying Living Expenses: Not hard at all   Food Insecurity: No Food Insecurity (8/14/2024)    Received from [facility] and [facility]    Hunger Vital Sign     Worried About Running Out of Food in the Last Year: Never true      Ran Out of Food in the Last Year: Never true   Transportation Needs: No Transportation Needs (8/14/2024)    Received from [facility] and [facility]    PRAPARE - Transportation     Lack of Transportation (Medical): No     Lack of Transportation (Non-Medical): No   Physical Activity: Insufficiently Active (8/14/2024)    Received from [facility] and [facility]    Exercise Vital Sign     Days of Exercise per Week: 3 days     Minutes of Exercise per Session: 20 min   Stress: No Stress Concern Present (8/14/2024)    Received from [facility] and [facility]    [employer] [employer] - Occupational Stress Questionnaire     Feeling of Stress : Not at all   Housing Stability: Low Risk  (8/14/2024)    Received from [facility] and [facility]    Housing Stability Vital Sign     Unable to Pay for Housing in the Last Year: No     Number of Times Moved in the Last Year: 1     Homeless in the Last Year: No     No family history on file.  Review of patient's allergies indicates:   Allergen Reactions    Celecoxib Swelling    Gabapentin Swelling    Hydroxychloroquine Nausea Only       Current Outpatient Medications:     acetaminophen-codeine 300-30mg (TYLENOL #3) 300-30 mg Tab, Take 1 tablet by mouth., Disp: , Rfl:     dexmethylphenidate (FOCALIN XR) 20 MG 24 hr capsule, Take 20 mg by mouth every morning., Disp: , Rfl:     dextroamphetamine-amphetamine (ADDERALL) 20 mg tablet, Adderall 20 mg tablet  Take 1 tablet every day by oral route., Disp: , Rfl:     EScitalopram oxalate (LEXAPRO) 20 MG tablet, Take 20 mg by mouth once daily., Disp: , Rfl:     estradioL (ESTRACE) 2 MG tablet, Take 2 mg by mouth once daily., Disp: , Rfl:     folic acid (FOLVITE) 1 MG tablet, Take 1,000 mcg by mouth., Disp: , Rfl:     LINZESS 145 mcg Cap capsule, Take 145 mcg by mouth., Disp: , Rfl:     metaxalone (SKELAXIN) 800 MG tablet, , Disp: , Rfl:      "methotrexate (XATMEP ORAL), methotrexate  three pills once a week, Disp: , Rfl:     traZODone (DESYREL) 50 MG tablet, TAKE 1 TABLET BY MOUTH ONCE DAILY AT BEDTIME AS NEEDED FOR SLEEP, Disp: , Rfl:     HYDROcodone-acetaminophen (NORCO) 7.5-325 mg per tablet, Take 1 tablet by mouth 2 (two) times daily as needed. (Patient not taking: Reported on 11/25/2024), Disp: , Rfl:     methocarbamoL (ROBAXIN) 750 MG Tab, Take 1 tablet (750 mg total) by mouth 2 (two) times daily as needed (back pain). (Patient not taking: Reported on 11/25/2024), Disp: 40 tablet, Rfl: 2    methylPREDNISolone (MEDROL DOSEPACK) 4 mg tablet, use as directed, Disp: 21 each, Rfl: 0  Blood pressure 137/89, pulse 61, resp. rate 18, height 5' 9" (1.753 m), weight 89.4 kg (197 lb 1.5 oz).      Neurological Exam  Mental Status  Awake, alert and oriented to person, place and time.    Cranial Nerves  CN VII:  Right: There is no facial weakness.  Left: There is no facial weakness.    Motor                                               Right                     Left  Deltoid                                   5                          5   Biceps                                   5                          4+   Triceps                                  5                          4   Wrist flexor                            5                          5   Wrist extensor                       5                          5   Interossei                              5                          4   Iliopsoas                               5                          4-   Quadriceps                           5                          5  Ankle dorsiflexor                   5                          5  Ankle plantar flexor              5                           5    Sensory  Sensation is intact to light touch, pinprick, vibration and proprioception in all four extremities.    Reflexes                                            Right                      Left  Biceps         " "                        2+                         2+  Triceps                                2+                         2+  Patellar                                2+                         2+  Achilles                                0                         0    Right pathological reflexes: Johnny's absent. Ankle clonus absent.  Left pathological reflexes: Johnny's present. Ankle clonus absent.    Coordination  Right: Rapid alternating movement normal.Left: Rapid alternating movement normal.    Gait  Casual gait is normal including stance, stride, and arm swing.      Physical Exam  Vitals and nursing note reviewed.   Neurological:      Deep Tendon Reflexes:      Reflex Scores:       Tricep reflexes are 2+ on the right side and 2+ on the left side.       Bicep reflexes are 2+ on the right side and 2+ on the left side.       Patellar reflexes are 2+ on the right side and 2+ on the left side.       Achilles reflexes are 0 on the right side and 0 on the left side.        Vital Signs  Pulse: 61  Resp: 18  BP: 137/89  BP Location: Right arm  Patient Position: Sitting  Pain Score:   6  Pain Loc: Neck  Height and Weight  Height: 5' 9" (175.3 cm)  Weight: 89.4 kg (197 lb 1.5 oz)  BSA (Calculated - sq m): 2.09 sq meters  BMI (Calculated): 29.1  Weight in (lb) to have BMI = 25: 168.9]    Provider dictation:  Reviewed previous imaging 4/12/23 to reveal a disc herniation at T8. MRI c spine reveals reversal of lordosis C3-5; disc herniations without cord contact at the time.    Patient has left sided UE and HF weakness and a +Cox which is new from her last visit. She will obtain XR and MRI thoracic and cervical spine and return to clinic for review. She is going on vacation for the holiday so MDP sent to pharmacy for pain relief. All questions answered.    Visit Diagnosis:  Cervical stenosis of spine  -     X-Ray Cervical Spine AP Lat with Flexion  Extension; Future; Expected date: 11/25/2024  -     MRI Cervical Spine " Without Contrast; Future; Expected date: 11/25/2024    Thoracic radiculopathy  -     X-Ray Thoracic Spine AP Lateral; Future; Expected date: 11/25/2024  -     MRI Thoracic Spine Without Contrast; Future; Expected date: 11/25/2024    Other orders  -     methylPREDNISolone (MEDROL DOSEPACK) 4 mg tablet; use as directed  Dispense: 21 each; Refill: 0

## 2024-12-26 ENCOUNTER — HOSPITAL ENCOUNTER (OUTPATIENT)
Dept: RADIOLOGY | Facility: HOSPITAL | Age: 59
Discharge: HOME | End: 2024-12-26
Attending: PHYSICIAN ASSISTANT
Payer: MEDICARE

## 2024-12-26 DIAGNOSIS — M48.02 CERVICAL STENOSIS OF SPINE: ICD-10-CM

## 2024-12-26 DIAGNOSIS — M54.14 THORACIC RADICULOPATHY: ICD-10-CM

## 2024-12-26 PROCEDURE — 72146 MRI CHEST SPINE W/O DYE: CPT | Mod: TC,PO

## 2024-12-26 PROCEDURE — 72050 X-RAY EXAM NECK SPINE 4/5VWS: CPT | Mod: 26,,, | Performed by: RADIOLOGY

## 2024-12-26 PROCEDURE — 72146 MRI CHEST SPINE W/O DYE: CPT | Mod: 26,,, | Performed by: RADIOLOGY

## 2024-12-26 PROCEDURE — 72141 MRI NECK SPINE W/O DYE: CPT | Mod: 26,,, | Performed by: RADIOLOGY

## 2024-12-26 PROCEDURE — 72050 X-RAY EXAM NECK SPINE 4/5VWS: CPT | Mod: TC,FY,PO

## 2024-12-26 PROCEDURE — 72070 X-RAY EXAM THORAC SPINE 2VWS: CPT | Mod: 26,,, | Performed by: RADIOLOGY

## 2024-12-26 PROCEDURE — 72070 X-RAY EXAM THORAC SPINE 2VWS: CPT | Mod: TC,FY,PO

## 2024-12-26 PROCEDURE — 72141 MRI NECK SPINE W/O DYE: CPT | Mod: TC,PO

## 2025-01-07 ENCOUNTER — OFFICE VISIT (OUTPATIENT)
Dept: NEUROSURGERY | Facility: CLINIC | Age: 60
End: 2025-01-07
Payer: MEDICARE

## 2025-01-07 ENCOUNTER — TELEPHONE (OUTPATIENT)
Dept: PAIN MEDICINE | Facility: CLINIC | Age: 60
End: 2025-01-07
Payer: MEDICARE

## 2025-01-07 VITALS
SYSTOLIC BLOOD PRESSURE: 147 MMHG | HEART RATE: 69 BPM | DIASTOLIC BLOOD PRESSURE: 95 MMHG | BODY MASS INDEX: 29.19 KG/M2 | HEIGHT: 69 IN | RESPIRATION RATE: 18 BRPM | WEIGHT: 197.06 LBS

## 2025-01-07 DIAGNOSIS — M48.02 CERVICAL STENOSIS OF SPINE: Primary | ICD-10-CM

## 2025-01-07 PROCEDURE — 99215 OFFICE O/P EST HI 40 MIN: CPT | Mod: S$PBB,,, | Performed by: NEUROLOGICAL SURGERY

## 2025-01-07 RX ORDER — METHOCARBAMOL 500 MG/1
500 TABLET, FILM COATED ORAL 3 TIMES DAILY PRN
Qty: 21 TABLET | Refills: 0 | Status: SHIPPED | OUTPATIENT
Start: 2025-01-07 | End: 2025-01-14

## 2025-01-07 NOTE — H&P (VIEW-ONLY)
Neurosurgery History and Physical    Patient ID: Lo Feliciano is a 59 y.o. female.    Chief Complaint   Patient presents with    Follow-up     Imaging f/u     HPI 1/7/25:  Patient returns to clinic for review of imaging. She continues to report headaches in the base of her head to where she can't turn her head. She has tingling in the bilateral elbows to her hands that happens once a week ongoing for a few years. It happens at night when laying in bed. She wakes up with neck pain in the night. She is noticing that she loses  of things unexpectedly, worse over the last year. She has dropped empty cups, nothing heavy. She doesn't feel off balance when she walks. She has been seeing a pain doctor in [place] who just prescribes her medication, has not discussed ESIs for the cervical spine. The neck pain has been ongoing for 3 years now, worse when she reaches out.    The upper back tingling wraps around the chest. On the first episode, she was getting dog food sitting in her husbands truck when she had difficulty taking a deep breath and felt like she was going to pass out. The next episode occurred while she was walking in a parking lot. Now it has not occurred in the last few months, feels like sitting down aggravates it but when she goes to stand and walk is when the shortness of breath occurs.     HPI 11/25/24:  Patient is a 59 year old female who presents today for neck pain. She has had this pain for at least 3 years or so but noticed worsening in the last few months. She describes pain that radiates into the bilateral shoulders and has numbness and tingling into the bilateral pinky fingers that started 2 days ago. Her neck pain usually starts as a headache that then travels. She also is having worsening of a midback pain that wraps around under her bra line to her center chest, described as stabbing. She has had cardiac work up without any issues. She feels that she is dropping cups and pens, her  handwriting has gotten worse as well. She states her balance is off, but is not worsening since last year. She denies weakness.  For pain, she has tried ice, heat, toradol, T#3, and has norco but doesn't like to take it. She sees pain management, but has not had any injections since 2021. This injection was helpful at the time.     Review of Systems   Musculoskeletal:  Positive for neck pain.   Neurological:  Positive for weakness and numbness.       History reviewed. No pertinent past medical history.  Social History     Socioeconomic History    Marital status:    Tobacco Use    Smoking status: Never    Smokeless tobacco: Never   Substance and Sexual Activity    Alcohol use: Never    Drug use: Never     Social Drivers of Health     Financial Resource Strain: Low Risk  (8/14/2024)    Received from [facility] and [facility]    Overall Financial Resource Strain (CARDIA)     Difficulty of Paying Living Expenses: Not hard at all   Food Insecurity: No Food Insecurity (8/14/2024)    Received from [facility]    Hunger Vital Sign     Worried About Running Out of Food in the Last Year: Never true     Ran Out of Food in the Last Year: Never true   Transportation Needs: No Transportation Needs (8/14/2024)    Received from [facility]    PRAPARE - Transportation     Lack of Transportation (Medical): No     Lack of Transportation (Non-Medical): No   Physical Activity: Insufficiently Active (8/14/2024)    Received from [facility]    Exercise Vital Sign     Days of Exercise per Week: 3 days     Minutes of Exercise per Session: 20 min   Stress: No Stress Concern Present (8/14/2024)    Received from [facility]    [employer] - Occupational Stress Questionnaire     Feeling of Stress : Not at all   Housing Stability: Low Risk   "(8/14/2024)    Received from [facility] and [facility]    Housing Stability Vital Sign     Unable to Pay for Housing in the Last Year: No     Number of Times Moved in the Last Year: 1     Homeless in the Last Year: No     No family history on file.  Review of patient's allergies indicates:   Allergen Reactions    Celecoxib Swelling    Gabapentin Swelling    Hydroxychloroquine Nausea Only       Current Outpatient Medications:     acetaminophen-codeine 300-30mg (TYLENOL #3) 300-30 mg Tab, Take 1 tablet by mouth., Disp: , Rfl:     dexmethylphenidate (FOCALIN XR) 20 MG 24 hr capsule, Take 20 mg by mouth every morning., Disp: , Rfl:     dextroamphetamine-amphetamine (ADDERALL) 20 mg tablet, Adderall 20 mg tablet  Take 1 tablet every day by oral route., Disp: , Rfl:     estradioL (ESTRACE) 2 MG tablet, Take 2 mg by mouth once daily., Disp: , Rfl:     folic acid (FOLVITE) 1 MG tablet, Take 1,000 mcg by mouth., Disp: , Rfl:     EScitalopram oxalate (LEXAPRO) 20 MG tablet, Take 20 mg by mouth once daily., Disp: , Rfl:     HYDROcodone-acetaminophen (NORCO) 7.5-325 mg per tablet, Take 1 tablet by mouth 2 (two) times daily as needed. (Patient not taking: Reported on 11/25/2024), Disp: , Rfl:     LINZESS 145 mcg Cap capsule, Take 145 mcg by mouth., Disp: , Rfl:     methocarbamoL (ROBAXIN) 500 MG Tab, Take 1 tablet (500 mg total) by mouth 3 (three) times daily as needed., Disp: 21 tablet, Rfl: 0    methotrexate (XATMEP ORAL), methotrexate  three pills once a week (Patient not taking: Reported on 1/7/2025), Disp: , Rfl:     traZODone (DESYREL) 50 MG tablet, TAKE 1 TABLET BY MOUTH ONCE DAILY AT BEDTIME AS NEEDED FOR SLEEP, Disp: , Rfl:   Blood pressure (!) 147/95, pulse 69, resp. rate 18, height 5' 9" (1.753 m), weight 89.4 kg (197 lb 1.5 oz).      Neurological Exam  Mental Status  Awake, alert and oriented to person, place and time.    Cranial Nerves  CN VII:  Right: There is no facial weakness.  Left: There " is no facial weakness.    Motor                                               Right                     Left  Deltoid                                   5                          5   Biceps                                   5                          5   Triceps                                  4                          4   Wrist flexor                            5                          5   Wrist extensor                       5                          5   Interossei                              4                          4   Iliopsoas                               5                          4   Quadriceps                           5                          5  Ankle dorsiflexor                   5                          5  Ankle plantar flexor              5                           5  Extensor hallucis longus      4+                           5    Sensory  Light touch is normal in upper and lower extremities.   No current sensory changes .    Reflexes                                            Right                      Left  Biceps                                 2+                         2+  Triceps                                2+                         2+  Patellar                                2+                         2+  Achilles                                0                         0  Right Plantar: downgoing  Left Plantar: downgoing    Right pathological reflexes: Johnny's absent. Ankle clonus absent.  Left pathological reflexes: Johnny's absent. Ankle clonus absent.    Coordination  Right: Rapid alternating movement abnormality:Left: Rapid alternating movement abnormality:  Slow.    Gait  Casual gait is normal including stance, stride, and arm swing. Romberg is absent.      Physical Exam  Vitals and nursing note reviewed.   Neurological:      Coordination: Romberg sign negative.      Deep Tendon Reflexes:      Reflex Scores:       Tricep reflexes are 2+ on the right side and 2+ on the  "left side.       Bicep reflexes are 2+ on the right side and 2+ on the left side.       Patellar reflexes are 2+ on the right side and 2+ on the left side.       Achilles reflexes are 0 on the right side and 0 on the left side.        Vital Signs  Pulse: 69  Resp: 18  BP: (!) 147/95  BP Location: Right arm  Patient Position: Sitting  Pain Score:   6  Pain Loc: Neck  Height and Weight  Height: 5' 9" (175.3 cm)  Weight: 89.4 kg (197 lb 1.5 oz)  BSA (Calculated - sq m): 2.09 sq meters  BMI (Calculated): 29.1  Weight in (lb) to have BMI = 25: 168.9]    Provider dictation:  Imaging reviewed by Dr. Ramon.   MRI cervical spine reveals C3-7 disc herniations; C5-6 disc herniation causing central stenosis causing ventral cord flattening with bilateral foraminal stenosis.  MRI thoracic spine reveals small disc herniation at T7-8.     For her thoracic spine, no surgical indications.   For the cervical spine, she has intermittent Cox with persistent and consistent weakness. She would like to try conservative treatment with an STIVEN. Surgery would involve C4-7 cervical fusion if she fails conservative treatment. Will want to reexamine after her STIVEN with close monitoring.    Will send her robaxin for muscle spasms for the neck to be continued by pain management.     Visit Diagnosis:  Cervical stenosis of spine  -     Procedure Order to Pain Management; Future; Expected date: 01/07/2025    Other orders  -     methocarbamoL (ROBAXIN) 500 MG Tab; Take 1 tablet (500 mg total) by mouth 3 (three) times daily as needed.  Dispense: 21 tablet; Refill: 0          "

## 2025-01-07 NOTE — PROGRESS NOTES
Neurosurgery History and Physical    Patient ID: Lo Feliciano is a 59 y.o. female.    Chief Complaint   Patient presents with    Follow-up     Imaging f/u     HPI 1/7/25:  Patient returns to clinic for review of imaging. She continues to report headaches in the base of her head to where she can't turn her head. She has tingling in the bilateral elbows to her hands that happens once a week ongoing for a few years. It happens at night when laying in bed. She wakes up with neck pain in the night. She is noticing that she loses  of things unexpectedly, worse over the last year. She has dropped empty cups, nothing heavy. She doesn't feel off balance when she walks. She has been seeing a pain doctor in [place] who just prescribes her medication, has not discussed ESIs for the cervical spine. The neck pain has been ongoing for 3 years now, worse when she reaches out.    The upper back tingling wraps around the chest. On the first episode, she was getting dog food sitting in her husbands truck when she had difficulty taking a deep breath and felt like she was going to pass out. The next episode occurred while she was walking in a parking lot. Now it has not occurred in the last few months, feels like sitting down aggravates it but when she goes to stand and walk is when the shortness of breath occurs.     HPI 11/25/24:  Patient is a 59 year old female who presents today for neck pain. She has had this pain for at least 3 years or so but noticed worsening in the last few months. She describes pain that radiates into the bilateral shoulders and has numbness and tingling into the bilateral pinky fingers that started 2 days ago. Her neck pain usually starts as a headache that then travels. She also is having worsening of a midback pain that wraps around under her bra line to her center chest, described as stabbing. She has had cardiac work up without any issues. She feels that she is dropping cups and pens, her  handwriting has gotten worse as well. She states her balance is off, but is not worsening since last year. She denies weakness.  For pain, she has tried ice, heat, toradol, T#3, and has norco but doesn't like to take it. She sees pain management, but has not had any injections since 2021. This injection was helpful at the time.     Review of Systems   Musculoskeletal:  Positive for neck pain.   Neurological:  Positive for weakness and numbness.       History reviewed. No pertinent past medical history.  Social History     Socioeconomic History    Marital status:    Tobacco Use    Smoking status: Never    Smokeless tobacco: Never   Substance and Sexual Activity    Alcohol use: Never    Drug use: Never     Social Drivers of Health     Financial Resource Strain: Low Risk  (8/14/2024)    [facility]    Overall Financial Resource Strain (CARDIA)     Difficulty of Paying Living Expenses: Not hard at all   Food Insecurity: No Food Insecurity (8/14/2024)    Received from [facility]    Hunger Vital Sign     Worried About Running Out of Food in the Last Year: Never true     Ran Out of Food in the Last Year: Never true   Transportation Needs: No Transportation Needs (8/14/2024)    Received from [facility]    PRAPARE - Transportation     Lack of Transportation (Medical): No     Lack of Transportation (Non-Medical): No   Physical Activity: Insufficiently Active (8/14/2024)    Received from [facility]    Exercise Vital Sign     Days of Exercise per Week: 3 days     Minutes of Exercise per Session: 20 min   Stress: No Stress Concern Present (8/14/2024)    Received from [facility]    [employer] [place] [employer] - Occupational Stress Questionnaire     Feeling of Stress : Not at all   Housing Stability: Low Risk   "(8/14/2024)    Received from [facility]    Housing Stability Vital Sign     Unable to Pay for Housing in the Last Year: No     Number of Times Moved in the Last Year: 1     Homeless in the Last Year: No     No family history on file.  Review of patient's allergies indicates:   Allergen Reactions    Celecoxib Swelling    Gabapentin Swelling    Hydroxychloroquine Nausea Only       Current Outpatient Medications:     acetaminophen-codeine 300-30mg (TYLENOL #3) 300-30 mg Tab, Take 1 tablet by mouth., Disp: , Rfl:     dexmethylphenidate (FOCALIN XR) 20 MG 24 hr capsule, Take 20 mg by mouth every morning., Disp: , Rfl:     dextroamphetamine-amphetamine (ADDERALL) 20 mg tablet, Adderall 20 mg tablet  Take 1 tablet every day by oral route., Disp: , Rfl:     estradioL (ESTRACE) 2 MG tablet, Take 2 mg by mouth once daily., Disp: , Rfl:     folic acid (FOLVITE) 1 MG tablet, Take 1,000 mcg by mouth., Disp: , Rfl:     EScitalopram oxalate (LEXAPRO) 20 MG tablet, Take 20 mg by mouth once daily., Disp: , Rfl:     HYDROcodone-acetaminophen (NORCO) 7.5-325 mg per tablet, Take 1 tablet by mouth 2 (two) times daily as needed. (Patient not taking: Reported on 11/25/2024), Disp: , Rfl:     LINZESS 145 mcg Cap capsule, Take 145 mcg by mouth., Disp: , Rfl:     methocarbamoL (ROBAXIN) 500 MG Tab, Take 1 tablet (500 mg total) by mouth 3 (three) times daily as needed., Disp: 21 tablet, Rfl: 0    methotrexate (XATMEP ORAL), methotrexate  three pills once a week (Patient not taking: Reported on 1/7/2025), Disp: , Rfl:     traZODone (DESYREL) 50 MG tablet, TAKE 1 TABLET BY MOUTH ONCE DAILY AT BEDTIME AS NEEDED FOR SLEEP, Disp: , Rfl:   Blood pressure (!) 147/95, pulse 69, resp. rate 18, height 5' 9" (1.753 m), weight 89.4 kg (197 lb 1.5 oz).      Neurological Exam  Mental Status  Awake, alert and oriented to person, place and time.    Cranial Nerves  CN VII:  Right: There is no facial weakness.  Left: There " is no facial weakness.    Motor                                               Right                     Left  Deltoid                                   5                          5   Biceps                                   5                          5   Triceps                                  4                          4   Wrist flexor                            5                          5   Wrist extensor                       5                          5   Interossei                              4                          4   Iliopsoas                               5                          4   Quadriceps                           5                          5  Ankle dorsiflexor                   5                          5  Ankle plantar flexor              5                           5  Extensor hallucis longus      4+                           5    Sensory  Light touch is normal in upper and lower extremities.   No current sensory changes .    Reflexes                                            Right                      Left  Biceps                                 2+                         2+  Triceps                                2+                         2+  Patellar                                2+                         2+  Achilles                                0                         0  Right Plantar: downgoing  Left Plantar: downgoing    Right pathological reflexes: Johnny's absent. Ankle clonus absent.  Left pathological reflexes: Johnny's absent. Ankle clonus absent.    Coordination  Right: Rapid alternating movement abnormality:Left: Rapid alternating movement abnormality:  Slow.    Gait  Casual gait is normal including stance, stride, and arm swing. Romberg is absent.      Physical Exam  Vitals and nursing note reviewed.   Neurological:      Coordination: Romberg sign negative.      Deep Tendon Reflexes:      Reflex Scores:       Tricep reflexes are 2+ on the right side and 2+ on the  "left side.       Bicep reflexes are 2+ on the right side and 2+ on the left side.       Patellar reflexes are 2+ on the right side and 2+ on the left side.       Achilles reflexes are 0 on the right side and 0 on the left side.        Vital Signs  Pulse: 69  Resp: 18  BP: (!) 147/95  BP Location: Right arm  Patient Position: Sitting  Pain Score:   6  Pain Loc: Neck  Height and Weight  Height: 5' 9" (175.3 cm)  Weight: 89.4 kg (197 lb 1.5 oz)  BSA (Calculated - sq m): 2.09 sq meters  BMI (Calculated): 29.1  Weight in (lb) to have BMI = 25: 168.9]    Provider dictation:  Imaging reviewed by Dr. Ramon.   MRI cervical spine reveals C3-7 disc herniations; C5-6 disc herniation causing central stenosis causing ventral cord flattening with bilateral foraminal stenosis.  MRI thoracic spine reveals small disc herniation at T7-8.     For her thoracic spine, no surgical indications.   For the cervical spine, she has intermittent Cox with persistent and consistent weakness. She would like to try conservative treatment with an STIVEN. Surgery would involve C4-7 cervical fusion if she fails conservative treatment. Will want to reexamine after her STIVEN with close monitoring.    Will send her robaxin for muscle spasms for the neck to be continued by pain management.     Visit Diagnosis:  Cervical stenosis of spine  -     Procedure Order to Pain Management; Future; Expected date: 01/07/2025    Other orders  -     methocarbamoL (ROBAXIN) 500 MG Tab; Take 1 tablet (500 mg total) by mouth 3 (three) times daily as needed.  Dispense: 21 tablet; Refill: 0          "

## 2025-01-08 DIAGNOSIS — M54.12 CERVICAL RADICULOPATHY: Primary | ICD-10-CM

## 2025-01-08 RX ORDER — SODIUM CHLORIDE, SODIUM LACTATE, POTASSIUM CHLORIDE, CALCIUM CHLORIDE 600; 310; 30; 20 MG/100ML; MG/100ML; MG/100ML; MG/100ML
INJECTION, SOLUTION INTRAVENOUS CONTINUOUS
OUTPATIENT
Start: 2025-01-08

## 2025-01-08 NOTE — TELEPHONE ENCOUNTER
Physician - Dr Lao    Type of Procedure/Injection - Cervical Epidural  C7/T1           Laterality - NA      Priority - Normal      Anxiolysis- RNIV      Need to hold medication - No      N/A    None      Clearance needed - No      Follow up - 3 week

## 2025-01-08 NOTE — TELEPHONE ENCOUNTER
----- Message from Claudette Maxwell PA-C sent at 2025 10:17 AM CST -----  Regarding: Order for JON YUAN    Patient Name: JON YUAN([id number])  Sex: Female  : 1965      PCP: RENEA PERSAUD    Center: [facility]     Types of orders made on 2025: Procedure Request    Order Date:2025  Ordering User:CLAUDETTE MAXWELL [[id number]]  Encounter Provider:Ernst Oliver MD [id number]]  Authorizing Provider: Claudette Maxwell PA-C [[id number]]  Supervising Provider:ERNST OLIVER [[id number]]  Type of Supervision:Supervision Required  Department:[facility][[id number]]    Common Order Information  Procedure -> Epidural Injection (specify level) Cmt: C7-T1    Order Specific Information  Order: Procedure Order to Pain Management [Custom: [id number]]  Order #:          [id number]: 1 FUTURE    Priority: Routine  Class: Clinic Performed    Future Order Information      Expires on:2026            Expected by:2025                   Associated Diagnoses      M48.02 Cervical stenosis of spine      Physician -> Thayer         Facility Name: [facility]         Follow-up: -> 2 weeks           Priority: Routine  Class: Clinic Performed    Future Order Information      Expires on:2026            Expected by:2025                   Associated Diagnoses      M48.02 Cervical stenosis of spine      Procedure -> Epidural Injection (specify level) Cmt: C7-T1        Physician -> Shilo         Facility Name: [facility]         Follow-up: -> 2 weeks

## 2025-01-15 ENCOUNTER — HOSPITAL ENCOUNTER (OUTPATIENT)
Facility: HOSPITAL | Age: 60
Discharge: HOME | End: 2025-01-15
Attending: ANESTHESIOLOGY | Admitting: ANESTHESIOLOGY
Payer: MEDICARE

## 2025-01-15 ENCOUNTER — HOSPITAL ENCOUNTER (OUTPATIENT)
Dept: RADIOLOGY | Facility: HOSPITAL | Age: 60
Discharge: HOME | End: 2025-01-15
Attending: ANESTHESIOLOGY | Admitting: ANESTHESIOLOGY
Payer: MEDICARE

## 2025-01-15 DIAGNOSIS — M54.12 CERVICAL RADICULOPATHY: ICD-10-CM

## 2025-01-15 DIAGNOSIS — M54.2 NECK PAIN: ICD-10-CM

## 2025-01-15 PROCEDURE — 62321 NJX INTERLAMINAR CRV/THRC: CPT | Mod: ,,, | Performed by: ANESTHESIOLOGY

## 2025-01-15 PROCEDURE — 62321 NJX INTERLAMINAR CRV/THRC: CPT | Mod: PO | Performed by: ANESTHESIOLOGY

## 2025-01-15 PROCEDURE — 63600175 PHARM REV CODE 636 W HCPCS: Mod: PO | Performed by: ANESTHESIOLOGY

## 2025-01-15 PROCEDURE — 25500020 PHARM REV CODE 255: Mod: PO | Performed by: ANESTHESIOLOGY

## 2025-01-15 RX ORDER — SODIUM CHLORIDE, SODIUM LACTATE, POTASSIUM CHLORIDE, CALCIUM CHLORIDE 600; 310; 30; 20 MG/100ML; MG/100ML; MG/100ML; MG/100ML
INJECTION, SOLUTION INTRAVENOUS CONTINUOUS
Status: DISCONTINUED | OUTPATIENT
Start: 2025-01-15 | End: 2025-01-15 | Stop reason: HOSPADM

## 2025-01-15 RX ORDER — METHYLPREDNISOLONE ACETATE 80 MG/ML
INJECTION, SUSPENSION INTRA-ARTICULAR; INTRALESIONAL; INTRAMUSCULAR; SOFT TISSUE
Status: DISCONTINUED | OUTPATIENT
Start: 2025-01-15 | End: 2025-01-15 | Stop reason: HOSPADM

## 2025-01-15 RX ORDER — MIDAZOLAM HYDROCHLORIDE 1 MG/ML
INJECTION INTRAMUSCULAR; INTRAVENOUS
Status: DISCONTINUED | OUTPATIENT
Start: 2025-01-15 | End: 2025-01-15 | Stop reason: HOSPADM

## 2025-01-15 RX ORDER — LIDOCAINE HYDROCHLORIDE 10 MG/ML
INJECTION, SOLUTION EPIDURAL; INFILTRATION; INTRACAUDAL; PERINEURAL
Status: DISCONTINUED | OUTPATIENT
Start: 2025-01-15 | End: 2025-01-15 | Stop reason: HOSPADM

## 2025-01-15 NOTE — OP NOTE
PROCEDURE DATE: 1/15/2025    Procedure: C7-T1 cervical interlaminar epidural steroid injection under utilizing fluoroscopy.    Diagnosis: Cervical Radiculopathy    POSTOP DIAGNOSIS: SAME    Physician: Bryan Lao MD    Medications injected:  Methylprednisone 80mg followed by a slow injection of 4 mL sterile, preservative-free normal saline.    Local anesthetic used: Lidocaine 1%, 4 ml.    Sedation Medications: 2mg versed    Complications:  none    Estimated blood loss: none    Technique:  A time-out was taken to identify patient and procedure prior to starting the procedure.  With the patient laying in a prone position with the neck in a mid-flexed forward position, the area was prepped and draped in the usual sterile fashion using ChloraPrep and a fenestrated drape.  The area was determined under AP fluoroscopic guidance.  Local anesthetic was given using a 25-gauge 1.5 inch needle by raising a wheal and then infiltrating ventrally.  A 3.5 inch 20-gauge Touhy needle was introduced under fluoroscopic guidance to meet the lamina of C7.  The needle was then hinged under the lamina then advanced using loss of resistance technique.  Once the tip of the needle was in the desired position, the contrast dye Omnipaque was injected to determine placement and no uptake.  The steroid was then injected slowly followed by a slow injection of 4 mL of the sterile preservative-free normal saline.  The patient tolerated the procedure well.    The patient was monitored after the procedure and was given post-procedure and discharge instructions to follow at home. The patient was discharged in a stable condition.

## 2025-01-15 NOTE — DISCHARGE SUMMARY
[facility] - Surgery  Discharge Note  Short Stay    Procedure(s) (LRB):  Injection-steroid-epidural-cervical  C7/T1 (N/A)      OUTCOME: Patient tolerated treatment/procedure well without complication and is now ready for discharge.    DISPOSITION: Home or Self Care    FINAL DIAGNOSIS:  Cervical radiculopathy    FOLLOWUP: In clinic    DISCHARGE INSTRUCTIONS:    Discharge Procedure Orders   Diet Adult Regular     No dressing needed     Notify your health care provider if you experience any of the following:  temperature >100.4     Activity as tolerated

## 2025-01-16 VITALS
RESPIRATION RATE: 18 BRPM | OXYGEN SATURATION: 99 % | HEIGHT: 69 IN | WEIGHT: 177 LBS | HEART RATE: 82 BPM | BODY MASS INDEX: 26.22 KG/M2 | TEMPERATURE: 99 F | DIASTOLIC BLOOD PRESSURE: 81 MMHG | SYSTOLIC BLOOD PRESSURE: 126 MMHG

## 2025-03-27 ENCOUNTER — OFFICE VISIT (OUTPATIENT)
Dept: PAIN MEDICINE | Facility: CLINIC | Age: 60
End: 2025-03-27
Payer: MEDICARE

## 2025-03-27 VITALS
WEIGHT: 176.5 LBS | HEIGHT: 69 IN | SYSTOLIC BLOOD PRESSURE: 144 MMHG | DIASTOLIC BLOOD PRESSURE: 86 MMHG | HEART RATE: 72 BPM | BODY MASS INDEX: 26.14 KG/M2

## 2025-03-27 DIAGNOSIS — M54.12 CERVICAL RADICULOPATHY: Primary | ICD-10-CM

## 2025-03-27 DIAGNOSIS — M47.812 CERVICAL SPONDYLOSIS: ICD-10-CM

## 2025-03-27 PROCEDURE — 99999 PR PBB SHADOW E&M-EST. PATIENT-LVL III: CPT | Mod: PBBFAC,,, | Performed by: ANESTHESIOLOGY

## 2025-03-27 PROCEDURE — 99214 OFFICE O/P EST MOD 30 MIN: CPT | Mod: S$PBB,,, | Performed by: ANESTHESIOLOGY

## 2025-03-27 PROCEDURE — 99213 OFFICE O/P EST LOW 20 MIN: CPT | Mod: PBBFAC,PO | Performed by: ANESTHESIOLOGY

## 2025-03-27 RX ORDER — METHOCARBAMOL 750 MG/1
750 TABLET, FILM COATED ORAL 4 TIMES DAILY
Qty: 40 TABLET | Refills: 0 | Status: SHIPPED | OUTPATIENT
Start: 2025-03-27

## 2025-03-27 NOTE — PROGRESS NOTES
"This note was completed with dictation software and grammatical errors may exist.    Chief Complaint   Patient presents with    Low-back Pain    Pain    Neck Pain        HPI: Lo Feliciano is a 59 y.o. year old female patient who has a past medical history of Cervical radiculopathy. She presents in referral from [facility] for neck pain.  She returns in follow-up today, had seen Dr. Ramon for neck pain, numbness and clumsiness in her hands.  They had discussed possible surgery at C4 through C7 due to her symptoms but since she does not have any severe cord compression, they discussed continuing conservative measures and she was sent for a cervical epidural steroid injection.   She is status post C7/T1 STIVEN on 01/15/2025 with  No major benefit.  She continues to have pain in the occipital region and axial neck but not in the shoulder blades, denies any pain in the scapula or arms.  She does feel that she has been clumsy and dropping things.  She was supposed to attend physical therapy but nobody had called her.      Pain intervention history:  We had previously done a T7/8 STIVEN on 11/12/2021 with relief.    Spine surgeries:    Antineuropathics:  NSAIDs:  Physical therapy:  Antidepressants:  Muscle relaxers: methocarbamol  Opioids:  Tylenol No. 3  Antiplatelets/Anticoagulants:        ROS:  She reports balance issues, dexterity issues, joint pain.  Balance of review of systems is negative.    No results found for: "LABA1C", "HGBA1C"    No results found for: "WBC", "HGB", "HCT", "MCV", "PLT"          Past Medical History:   Diagnosis Date    Cervical radiculopathy 01/15/2025       Past Surgical History:   Procedure Laterality Date    CHOLECYSTECTOMY      EPIDURAL STEROID INJECTION INTO CERVICAL SPINE N/A 1/15/2025    Procedure: Injection-steroid-epidural-cervical  C7/T1;  Surgeon: Bryan Lao MD;  Location: [facility];  Service: Pain Management;  Laterality: N/A;  normal    EPIDURAL STEROID INJECTION " INTO THORACIC SPINE N/A 11/12/2021    Procedure: Injection-steroid-epidural-thoracic T7/8;  Surgeon: Bryan Lao MD;  Location: [facility];  Service: Pain Management;  Laterality: N/A;    FEMUR FRACTURE SURGERY  1991    HYSTERECTOMY      KNEE SURGERY      lower back surgery         Social History     Socioeconomic History    Marital status:    Tobacco Use    Smoking status: Never    Smokeless tobacco: Never   Substance and Sexual Activity    Alcohol use: Never    Drug use: Never     Social Drivers of Health     Financial Resource Strain: Low Risk  (8/14/2024)    Received from [facility]    Overall Financial Resource Strain (CARDIA)     Difficulty of Paying Living Expenses: Not hard at all   Food Insecurity: No Food Insecurity (8/14/2024)    Received from [facility]    Hunger Vital Sign     Worried About Running Out of Food in the Last Year: Never true     Ran Out of Food in the Last Year: Never true   Transportation Needs: No Transportation Needs (8/14/2024)    Received from [facility]    PRAPARE - Transportation     Lack of Transportation (Medical): No     Lack of Transportation (Non-Medical): No   Physical Activity: Insufficiently Active (8/14/2024)    Received from [facility]    Exercise Vital Sign     Days of Exercise per Week: 3 days     Minutes of Exercise per Session: 20 min   Stress: No Stress Concern Present (8/14/2024)    Received from [facility]    [employer] [place] [employer] - Occupational Stress Questionnaire     Feeling of Stress : Not at all   Housing Stability: Low Risk  (8/14/2024)    Received from [facility]    Housing Stability Vital Sign     Unable to Pay for Housing in the Last Year: No     Number of Times Moved in the Last Year: 1     Homeless in the  Last Year: No         Medications/Allergies: See med card    There were no vitals filed for this visit.  There is no height or weight on file to calculate BMI.      2021     7:58 AM   Last 3 PDI Scores   Pain Disability Index (PDI) 17       Physical exam:  Gen: A and O x3, pleasant, well-groomed  Musculoskeletal: . No antalgic gait.   Coordination   Romberg:  positive for imbalance  Tandem walking coordination: Abnormal    Neuro:  Motor:    Right Left   C4 Shoulder Abduction  5  5   C5 Elbow Flexion    5  5   C6 Wrist Extension  5  5   C7 Elbow Extension   5  5   C8/T1 Hand Intrinsics   4  4   C8 First Dorsal Interosseus  5  5   C8 Abductor Pollicus Brevis  5  5      Left  Right    Triceps DTR 2+ 2+   Biceps DTR 2+ 2+   Brachioradialis DTR 2+ 2+   Patellar DTR 3+ 3+   Achilles DTR 2+ 2+   Cox Absent  Absent   Clonus Absent Absent            Sensory: Intact and symmetrical to light touch and pinprick in C2-T1 dermatomes bilaterally.  Cervical spine: ROM is  mildly reduced with flexion extension and moderately reduced with lateral rotation to either side  With increased pain on extension.  Spurling's maneuver causes neck pain to either side.  Myofascial exam: No Tenderness to palpation across cervical paraspinous region bilaterally.    Imagin24 MRI C-spine:  Vertebral Column: Vertebral body heights are maintained. No acute fracture is identified; however, if trauma is suspected, a CT scan would be a more sensitive examination for fractures. No evidence of an aggressive marrow replacement process. Multilevel disc degeneration, again most pronounced at C4-5 through C6-7 with moderate to severe disc space narrowing, disc desiccation, anterior marginal osteophyte formation and posterior disc osteophyte complexes.  Stable small presumed incidental hemangioma in the C7 vertebral body.   Cord: Normal signal and morphology.   Skull base and craniocervical junction: Normal.   Degenerative findings:   C2-C3:  Mild posterior disc osteophyte complex.  Mild bilateral facet arthropathy.  Mild left neural foraminal stenosis.  No significant spinal canal stenosis.   C3-C4: Posterior disc osteophyte complex, which mildly effaces the ventral thecal sac.  Severe right and moderate left facet arthropathy.  Marked right and moderate left uncovertebral joint spurring.  Severe right and moderate left neural foraminal stenosis.  Mild spinal canal stenosis.   C4-C5: Moderate disc space narrowing.  Posterior disc osteophyte complex, which mildly effaces the ventral thecal sac.  Moderate bilateral facet arthropathy.  Marked left and moderate right uncovertebral joint spurring.  Moderate to severe bilateral neural foraminal stenosis.  Mild spinal canal stenosis.   C5-C6: Posterior disc osteophyte complex, which partially effaces the ventral thecal sac.  Moderate bilateral facet arthropathy.  Marked bilateral uncovertebral joint spurring.  Severe right and moderate left neural foraminal stenosis.  Ligamentum flavum thickening.  Mild spinal canal stenosis.   C6-C7: Posterior disc osteophyte complex, which mildly effaces the ventral thecal sac.  Mild bilateral facet arthropathy.  Marked left greater than right uncovertebral joint spurring.  Severe left and moderate to severe right neural foraminal stenosis.  Mild spinal canal stenosis.   C7-T1: Mild posterior disc osteophyte complex.  Mild bilateral facet arthropathy.  Mild bilateral uncovertebral joint spurring.   No significant neural foraminal or spinal canal stenosis.    Lo Feliciano is a 59 y.o. year old female patient who has a past medical history of Cervical radiculopathy. She presents in referral from [facility] for neck pain      1. Cervical radiculopathy  Ambulatory Referral/Consult to Physical Therapy      2. Cervical spondylosis  Ambulatory Referral/Consult to Physical Therapy          Plan:    1.  We discussed that she did not have much relief with the epidural steroid  injection, asked if we could try this again.  I explained that since she did not have any relief I am not sure if a 2nd 1 would be helpful but we could try C6/7 STIVEN.  I explained that it may be better just to try physical therapy since she has not done this and I sent orders to [facility] in [place].  We may consider another injection but I explained that if her balance and dexterity are worsening I would not want her to delay proceeding with surgery too long.    Thank you for referring this interesting patient, and I look forward to continuing to collaborate in her care.

## 2025-04-14 ENCOUNTER — TELEPHONE (OUTPATIENT)
Dept: PAIN MEDICINE | Facility: CLINIC | Age: 60
End: 2025-04-14
Payer: MEDICARE

## 2025-04-14 NOTE — TELEPHONE ENCOUNTER
----- Message from Radhames sent at 4/14/2025  3:51 PM CDT -----  Type:  Needs Medical AdviceWho Called: pt Symptoms (please be specific): new recommendations for PT Would the patient rather a call back or a response via [facility]? Call back Best Call Back Number: [phone] Additional Information: please call to advise, Thank You.

## 2025-06-02 ENCOUNTER — OFFICE VISIT (OUTPATIENT)
Dept: PAIN MEDICINE | Facility: CLINIC | Age: 60
End: 2025-06-02
Payer: MEDICARE

## 2025-06-02 ENCOUNTER — TELEPHONE (OUTPATIENT)
Dept: PAIN MEDICINE | Facility: CLINIC | Age: 60
End: 2025-06-02

## 2025-06-02 VITALS
BODY MASS INDEX: 26.37 KG/M2 | WEIGHT: 178.56 LBS | SYSTOLIC BLOOD PRESSURE: 134 MMHG | HEART RATE: 75 BPM | DIASTOLIC BLOOD PRESSURE: 85 MMHG

## 2025-06-02 DIAGNOSIS — M54.12 CERVICAL RADICULOPATHY: ICD-10-CM

## 2025-06-02 DIAGNOSIS — M47.812 CERVICAL SPONDYLOSIS: Primary | ICD-10-CM

## 2025-06-02 PROCEDURE — 99214 OFFICE O/P EST MOD 30 MIN: CPT | Mod: S$PBB,,, | Performed by: ANESTHESIOLOGY

## 2025-06-02 PROCEDURE — 99213 OFFICE O/P EST LOW 20 MIN: CPT | Mod: PBBFAC,PO | Performed by: ANESTHESIOLOGY

## 2025-06-02 PROCEDURE — 99999 PR PBB SHADOW E&M-EST. PATIENT-LVL III: CPT | Mod: PBBFAC,,, | Performed by: ANESTHESIOLOGY

## 2025-06-04 DIAGNOSIS — M47.812 CERVICAL SPONDYLOSIS: Primary | ICD-10-CM

## 2025-06-04 RX ORDER — SODIUM CHLORIDE, SODIUM LACTATE, POTASSIUM CHLORIDE, CALCIUM CHLORIDE 600; 310; 30; 20 MG/100ML; MG/100ML; MG/100ML; MG/100ML
INJECTION, SOLUTION INTRAVENOUS CONTINUOUS
OUTPATIENT
Start: 2025-06-04

## 2025-07-23 ENCOUNTER — HOSPITAL ENCOUNTER (OUTPATIENT)
Dept: RADIOLOGY | Facility: HOSPITAL | Age: 60
Discharge: HOME | End: 2025-07-23
Attending: ANESTHESIOLOGY | Admitting: ANESTHESIOLOGY
Payer: MEDICARE

## 2025-07-23 ENCOUNTER — HOSPITAL ENCOUNTER (OUTPATIENT)
Facility: HOSPITAL | Age: 60
Discharge: HOME | End: 2025-07-23
Attending: ANESTHESIOLOGY | Admitting: ANESTHESIOLOGY
Payer: MEDICARE

## 2025-07-23 VITALS
HEART RATE: 74 BPM | TEMPERATURE: 97 F | WEIGHT: 180 LBS | OXYGEN SATURATION: 100 % | DIASTOLIC BLOOD PRESSURE: 84 MMHG | RESPIRATION RATE: 16 BRPM | HEIGHT: 70 IN | BODY MASS INDEX: 25.77 KG/M2 | SYSTOLIC BLOOD PRESSURE: 124 MMHG

## 2025-07-23 DIAGNOSIS — M54.2 NECK PAIN: ICD-10-CM

## 2025-07-23 DIAGNOSIS — M47.812 CERVICAL SPONDYLOSIS: ICD-10-CM

## 2025-07-23 PROCEDURE — 64491 INJ PARAVERT F JNT C/T 2 LEV: CPT | Mod: 50,KX,, | Performed by: ANESTHESIOLOGY

## 2025-07-23 PROCEDURE — 63600175 PHARM REV CODE 636 W HCPCS: Mod: PO | Performed by: ANESTHESIOLOGY

## 2025-07-23 PROCEDURE — 64490 INJ PARAVERT F JNT C/T 1 LEV: CPT | Mod: 50,KX,, | Performed by: ANESTHESIOLOGY

## 2025-07-23 PROCEDURE — 64491 INJ PARAVERT F JNT C/T 2 LEV: CPT | Mod: 50,PO | Performed by: ANESTHESIOLOGY

## 2025-07-23 PROCEDURE — 64490 INJ PARAVERT F JNT C/T 1 LEV: CPT | Mod: 50,PO | Performed by: ANESTHESIOLOGY

## 2025-07-23 RX ORDER — BUPIVACAINE HYDROCHLORIDE 2.5 MG/ML
INJECTION, SOLUTION EPIDURAL; INFILTRATION; INTRACAUDAL; PERINEURAL
Status: DISCONTINUED | OUTPATIENT
Start: 2025-07-23 | End: 2025-07-23 | Stop reason: HOSPADM

## 2025-07-23 RX ORDER — MIDAZOLAM HYDROCHLORIDE 1 MG/ML
INJECTION INTRAMUSCULAR; INTRAVENOUS
Status: DISCONTINUED | OUTPATIENT
Start: 2025-07-23 | End: 2025-07-23 | Stop reason: HOSPADM

## 2025-07-23 RX ORDER — LIDOCAINE HYDROCHLORIDE 10 MG/ML
INJECTION, SOLUTION EPIDURAL; INFILTRATION; INTRACAUDAL; PERINEURAL
Status: DISCONTINUED | OUTPATIENT
Start: 2025-07-23 | End: 2025-07-23 | Stop reason: HOSPADM

## 2025-07-23 RX ORDER — SODIUM CHLORIDE, SODIUM LACTATE, POTASSIUM CHLORIDE, CALCIUM CHLORIDE 600; 310; 30; 20 MG/100ML; MG/100ML; MG/100ML; MG/100ML
INJECTION, SOLUTION INTRAVENOUS CONTINUOUS
Status: DISCONTINUED | OUTPATIENT
Start: 2025-07-23 | End: 2025-07-23 | Stop reason: HOSPADM

## 2025-07-23 NOTE — H&P
CC: Back pain    HPI: The patient is a 59 with a history of cervical spondylosis here for bilateral C3,4,5 MBB. There are no major changes in history and physical from 6/2/25.    Past Medical History:   Diagnosis Date    Cervical radiculopathy 01/15/2025       Past Surgical History:   Procedure Laterality Date    CHOLECYSTECTOMY      EPIDURAL STEROID INJECTION INTO CERVICAL SPINE N/A 1/15/2025    Procedure: Injection-steroid-epidural-cervical  C7/T1;  Surgeon: Bryan Lao MD;  Location: [facility] OR;  Service: Pain Management;  Laterality: N/A;  normal    EPIDURAL STEROID INJECTION INTO THORACIC SPINE N/A 11/12/2021    Procedure: Injection-steroid-epidural-thoracic T7/8;  Surgeon: Bryan Lao MD;  Location: [facility] OR;  Service: Pain Management;  Laterality: N/A;    FEMUR FRACTURE SURGERY  1991    HYSTERECTOMY      KNEE SURGERY      lower back surgery         No family history on file.    Social History     Socioeconomic History    Marital status:    Tobacco Use    Smoking status: Never    Smokeless tobacco: Never   Substance and Sexual Activity    Alcohol use: Never    Drug use: Never     Social Drivers of Health     Financial Resource Strain: Low Risk  (8/14/2024)    Received from [facility]    Overall Financial Resource Strain (CARDIA)     Difficulty of Paying Living Expenses: Not hard at all   Food Insecurity: No Food Insecurity (8/14/2024)    Received from [facility]    Hunger Vital Sign     Worried About Running Out of Food in the Last Year: Never true     Ran Out of Food in the Last Year: Never true   Transportation Needs: No Transportation Needs (8/14/2024)    Received from [facility]    PRAPARE - Transportation     Lack of Transportation (Medical): No     Lack of Transportation (Non-Medical): No   Physical Activity: Insufficiently Active (8/14/2024)    Received from [place]  [facility]    Exercise Vital Sign     Days of Exercise per Week: 3 days     Minutes of Exercise per Session: 20 min   Stress: No Stress Concern Present (8/14/2024)    Received from [facility] and [facility]    [employer] - Occupational Stress Questionnaire     Feeling of Stress : Not at all   Housing Stability: Low Risk  (8/14/2024)    Received from [facility]    Housing Stability Vital Sign     Unable to Pay for Housing in the Last Year: No     Number of Times Moved in the Last Year: 1     Homeless in the Last Year: No       Current Medications[1]    Review of patient's allergies indicates:   Allergen Reactions    Celecoxib Swelling    Gabapentin Swelling    Hydroxychloroquine Nausea Only       Vitals:    07/16/25 0928 07/23/25 1429   BP:  126/78   Resp:  16   Temp:  97.2 °F (36.2 °C)   TempSrc:  Skin   SpO2:  99%   Weight: 81.6 kg (180 lb)    Height: 5' 10" (1.778 m)        ASA 2, Mallampati 2    REVIEW OF SYSTEMS:     GENERAL: No weight loss, malaise or fevers.  HEENT:  No recent changes in vision or hearing  NECK: Negative for lumps, no difficulty with swallowing.  RESPIRATORY: Negative for cough, wheezing or shortness of breath, patient denies any recent URI.  CARDIOVASCULAR: Negative for chest pain, leg swelling or palpitations.  GI: Negative for abdominal discomfort, blood in stools or black stools or change in bowel habits.  MUSCULOSKELETAL: See HPI.  SKIN: Negative for lesions, rash, and itching.  PSYCH: No suicidal or homicidal ideations, no current mood disturbances.  HEMATOLOGY/LYMPHOLOGY: Negative for prolonged bleeding, bruising easily or swollen nodes. Patient is not currently taking any anti-coagulants  ENDO: No history of diabetes or thyroid dysfunction  NEURO: No history of syncope, paralysis, seizures or tremors.All other reviewed and negative other than HPI.    Physical exam:  Gen: A " and O x3, pleasant, well-groomed  Skin: No rashes or obvious lesions  HEENT: PERRLA, no obvious deformities on ears or in canals. No thyroid masses, trachea midline, no palpable lymph nodes in neck, axilla.  CVS: Regular rate and rhythm, normal S1 and S2, no murmurs.  Resp: Clear to auscultation bilaterally.  Abdomen: Soft, NT/ND, normal bowel sounds present.  Musculoskeletal/Neuro: Moving all extremities    Assessment:  Cervical spondylosis  -     Place in Outpatient; Standing  -     Vital signs; Standing  -     Place 18-22 gaua peripheral IV ; Standing  -     Verify informed consent; Standing  -     Notify physician ; Standing  -     Notify physician ; Standing  -     Notify physician (specify); Standing  -     Notify physician (specify); Standing  -     Notify physician (specify); Standing  -     Diet NPO; Standing  -     lactated ringers infusion  -     POCT urine pregnancy; Standing    Other orders  -     IP VTE LOW RISK PATIENT; Standing               [1]   Current Facility-Administered Medications   Medication Dose Route Frequency Provider Last Rate Last Admin    lactated ringers infusion   Intravenous Continuous Bryan Lao MD

## 2025-07-23 NOTE — DISCHARGE INSTRUCTIONS
PAIN MANAGEMENT    HOME CARE INSTRUCTIONS   Do not use heat (such as a heating pad) for 24 hours.  You may apply an ice pack to the injection site for 20 minutes at a time for the first 24 hours for soreness/discomfort at injection site   Keep site clean and dry for 24 hours. If bandaid is present, remove when desired.              Do not soak for 48 hrs.   Do not drive until tomorrow.  Take care when walking after a lumbar injection.   Resume home medication as prescribed today.  Resume Aspirin, Plavix, or Coumadin the day after the procedure unless other wise instructed.        BLOCKS  Resume regular activities today.  Pain office will call in next 2 days.      CALL PHYSICIAN FOR:  Severe increase in your usual pain or the appearance of new pain.  Prolonged or increasing weakness or numbness in the legs or arms.  Fever greater than 100 degrees F.  Drainage, redness, active bleeding, or increased swelling at the injection site.  Headache that increases when your head is upright and decreases when you lie flat.    FOR EMERGENCIES:   Go directly to the emergency department for any shortness of breath, chest pain, or problems breathing.

## 2025-07-23 NOTE — DISCHARGE SUMMARY
[facility] - [facility]  Discharge Note  Short Stay    Procedure(s) (LRB):  Block-nerve-medial branch-cervical C3, C4, C5 (Bilateral)      OUTCOME: Patient tolerated treatment/procedure well without complication and is now ready for discharge.    DISPOSITION: Home or Self Care    FINAL DIAGNOSIS:  Cervical spondylosis    FOLLOWUP: In clinic    DISCHARGE INSTRUCTIONS:    Discharge Procedure Orders   Diet Adult Regular     No dressing needed     Notify your health care provider if you experience any of the following:  temperature >100.4     Activity as tolerated

## 2025-07-23 NOTE — OP NOTE
PROCEDURE DATE: 7/23/2025    PROCEDURE:  Diagnostic Cervical medial branch block of the bilateral C3,4,5 medial branch nerves on the bilateral-side utilizing fluoroscopy    DIAGNOSIS:  Cervical spondylosis    PHYSICIAN: Bryan Lao MD    MEDICATIONS INJECTED:  0.25% bupivicaine, 0.5ml at each level.    LOCAL ANESTHETIC USED:   1% lidocaine, 1ml at each level.    SEDATION MEDICATIONS: 2mg versed    ESTIMATED BLOOD LOSS:  none    COMPLICATIONS:  none    TECHNIQUE : A time-out was taken to identify patient and procedure side prior to starting the procedure.  The patient was positioned prone with the site of interest side up. The patient was prepped and draped in the usual sterile fashion using ChloraPrep and sterile towels.  The level was determined under fluoroscopic guidance using a slightly posteriorly oblique view.   Local anesthetic was infiltrated superficially at the skin level.  Then, a 25 gauge 3.5 inch needle was inserted to the anatomic location of the midsection of the lateral masses of right and then left C3,4,5. A cross table view was then taken to ensure that needles did not cross into neural foramina.  The above noted medication was then injected. The patient tolerated the procedure well.     The patient was monitored after the procedure. The patient will be contacted tomorrow to determine the extent of relief. The patient was given post procedure and discharge instructions to follow at home. The patient was discharged in a stable condition

## 2025-07-24 ENCOUNTER — TELEPHONE (OUTPATIENT)
Dept: PAIN MEDICINE | Facility: CLINIC | Age: 60
End: 2025-07-24
Payer: MEDICARE

## 2025-07-24 NOTE — TELEPHONE ENCOUNTER
Regarding your Cervical Medial Branch Block , For Date of Service:    7/23/25  Please answer the following questions:    1. What percentage of pain relief did you receive following the block, from 0-100%?     2. How many hours did pain relief last following the block?      3. During this time please describe in detail the activities you were able to do?    4. Pain score from 0-10 pre procedure -      5. Pain score from 0-10 during block  -     6. Return to baseline pain score of 0-10 once the block wore off -    LVM to inform pt that block questions are forwarded to her

## (undated) DEVICE — GLOVE SURGICAL LATEX SZ 7

## (undated) DEVICE — MARKER SKIN RULER STERILE

## (undated) DEVICE — NDL TUOHY EPIDURAL 20G X 3.5

## (undated) DEVICE — GLOVE SENSICARE PI MICRO 7

## (undated) DEVICE — APPLICATOR CHLORAPREP CLR 10.5

## (undated) DEVICE — SYR GLASS 5CC LUER LOK

## (undated) DEVICE — MARKER SKIN STND TIP BLUE BARR

## (undated) DEVICE — TRAY NERVE BLOCK

## (undated) DEVICE — TOWEL OR DISP STRL BLUE 4/PK